# Patient Record
Sex: MALE | Race: WHITE | NOT HISPANIC OR LATINO | Employment: FULL TIME | URBAN - METROPOLITAN AREA
[De-identification: names, ages, dates, MRNs, and addresses within clinical notes are randomized per-mention and may not be internally consistent; named-entity substitution may affect disease eponyms.]

---

## 2017-02-20 ENCOUNTER — TELEPHONE (OUTPATIENT)
Dept: FAMILY MEDICINE | Facility: CLINIC | Age: 42
End: 2017-02-20

## 2017-02-20 NOTE — TELEPHONE ENCOUNTER
"Patient complains that since 2/17/17 call to schedule appointment for shoulder/neck pain, (appointment  2/22/17)  he began with CP,SOB, L arm pain, facial pain, elevated BP. Went to [facility] ER and was admitted and discharged without cardiology referral.  Calls today for referral to cardiologist. During call, states that CP continues, although not as bad, /117 an hour ago. Explained that with these continuing symptoms, he needs to return to ER for evaluation. He states he understands, but needs cardiology referral. ER evaluation needed for appropriate treatment. He feels he is "getting run around" so explained that clinic is not set up to evaluate his symptoms, administer appropriate treatment,  and cannot give referral without assessment. Chances very good that if he comes here, he would be sent by ambulance to [facility] for evaluation. He states he does understand and to cancel 2/22/17 appointment.  "

## 2017-02-20 NOTE — TELEPHONE ENCOUNTER
----- Message from Caterina Jerez sent at 2/20/2017 10:48 AM CST -----  Contact: wife/Naveen/[phone]  Patients wife states that patient need to see the doctor today for a hospital f/u visit from [facility]/chest pain.  Please call [employer] at [phone].

## 2018-10-05 ENCOUNTER — OFFICE VISIT (OUTPATIENT)
Dept: URGENT CARE | Facility: CLINIC | Age: 43
End: 2018-10-05

## 2018-10-05 VITALS
HEART RATE: 97 BPM | TEMPERATURE: 97 F | WEIGHT: 256 LBS | RESPIRATION RATE: 16 BRPM | HEIGHT: 71 IN | SYSTOLIC BLOOD PRESSURE: 135 MMHG | BODY MASS INDEX: 35.84 KG/M2 | DIASTOLIC BLOOD PRESSURE: 88 MMHG | OXYGEN SATURATION: 97 %

## 2018-10-05 DIAGNOSIS — R10.32 LEFT LOWER QUADRANT ABDOMINAL PAIN OF UNKNOWN ETIOLOGY: ICD-10-CM

## 2018-10-05 DIAGNOSIS — R50.9 FEVER, UNSPECIFIED FEVER CAUSE: ICD-10-CM

## 2018-10-05 DIAGNOSIS — R10.9 ABDOMINAL PAIN, UNSPECIFIED ABDOMINAL LOCATION: Primary | ICD-10-CM

## 2018-10-05 DIAGNOSIS — R19.7 DIARRHEA, UNSPECIFIED TYPE: ICD-10-CM

## 2018-10-05 LAB
BILIRUB UR QL STRIP: NEGATIVE
GLUCOSE UR QL STRIP: NEGATIVE
KETONES UR QL STRIP: NEGATIVE
LEUKOCYTE ESTERASE UR QL STRIP: NEGATIVE
PH, POC UA: 5.5
POC BLOOD, URINE: NEGATIVE
POC NITRATES, URINE: NEGATIVE
PROT UR QL STRIP: NEGATIVE
SP GR UR STRIP: 1.02 (ref 1–1.03)
UROBILINOGEN UR STRIP-ACNC: NORMAL (ref 0.3–2.2)

## 2018-10-05 PROCEDURE — 99214 OFFICE O/P EST MOD 30 MIN: CPT | Mod: 25,S$GLB,, | Performed by: NURSE PRACTITIONER

## 2018-10-05 PROCEDURE — 81003 URINALYSIS AUTO W/O SCOPE: CPT | Mod: QW,S$GLB,, | Performed by: NURSE PRACTITIONER

## 2018-10-05 RX ORDER — LISINOPRIL 10 MG/1
10 TABLET ORAL DAILY
COMMUNITY
End: 2020-08-03 | Stop reason: DRUGHIGH

## 2018-10-05 NOTE — PROGRESS NOTES
"Subjective:       Patient ID: Shady Snyder is a 43 y.o. male.    Vitals:  height is 5' 11" (1.803 m) and weight is 116.1 kg (256 lb). His temperature is 97.3 °F (36.3 °C). His blood pressure is 135/88 and his pulse is 97. His respiration is 16 and oxygen saturation is 97%.     Chief Complaint: Headache; Abdominal Pain; and Fever (constant)  Complains of left lower quadrant abdominal pain with diarrhea for the past several days.  Headache    The current episode started today. The problem occurs constantly. The pain is located in the right unilateral region. The pain does not radiate. The pain quality is similar to prior headaches. The pain is at a severity of 6/10. The pain is mild. Associated symptoms include abdominal pain and a fever. Pertinent negatives include no back pain, blurred vision, nausea, sore throat or vomiting. He has tried NSAIDs for the symptoms. The treatment provided no relief.   Abdominal Pain   This is a new problem. The current episode started 1 to 4 weeks ago. The onset quality is gradual. The problem occurs intermittently. The problem has been gradually worsening. The pain is located in the LLQ and RUQ. The pain is at a severity of 10/10. The pain is severe. The quality of the pain is sharp and cramping. The abdominal pain radiates to the back. Associated symptoms include diarrhea, a fever and headaches. Pertinent negatives include no nausea or vomiting. Nothing aggravates the pain. The treatment provided mild relief.   Fever    This is a new problem. The current episode started 1 to 4 weeks ago. The problem occurs intermittently. His temperature was unmeasured prior to arrival. Associated symptoms include abdominal pain, diarrhea and headaches. Pertinent negatives include no chest pain, nausea, rash, sore throat or vomiting. He has tried NSAIDs for the symptoms. The treatment provided mild relief.     Review of Systems   Constitution: Positive for fever. Negative for chills.   HENT: " Negative for sore throat.    Eyes: Negative for blurred vision.   Cardiovascular: Negative for chest pain.   Respiratory: Negative for shortness of breath.    Skin: Negative for rash.   Musculoskeletal: Negative for back pain and joint pain.   Gastrointestinal: Positive for abdominal pain and diarrhea. Negative for nausea and vomiting.   Neurological: Positive for headaches.   Psychiatric/Behavioral: The patient is not nervous/anxious.        Objective:      Physical Exam   Constitutional: He is oriented to person, place, and time. He appears well-developed and well-nourished. He is cooperative.  Non-toxic appearance. He does not appear ill. No distress.   HENT:   Head: Normocephalic and atraumatic.   Right Ear: Hearing, tympanic membrane, external ear and ear canal normal.   Left Ear: Hearing, tympanic membrane, external ear and ear canal normal.   Nose: Nose normal. No mucosal edema, rhinorrhea or nasal deformity. No epistaxis. Right sinus exhibits no maxillary sinus tenderness and no frontal sinus tenderness. Left sinus exhibits no maxillary sinus tenderness and no frontal sinus tenderness.   Mouth/Throat: Uvula is midline, oropharynx is clear and moist and mucous membranes are normal. No trismus in the jaw. Normal dentition. No uvula swelling. No posterior oropharyngeal erythema.   Eyes: Conjunctivae and lids are normal. Right eye exhibits no discharge. Left eye exhibits no discharge. No scleral icterus.   Sclera clear bilat   Neck: Trachea normal, normal range of motion, full passive range of motion without pain and phonation normal. Neck supple.   Cardiovascular: Normal rate, regular rhythm, normal heart sounds, intact distal pulses and normal pulses.   Pulmonary/Chest: Effort normal and breath sounds normal. No respiratory distress.   Abdominal: Soft. Normal appearance and bowel sounds are normal. He exhibits no distension, no pulsatile midline mass and no mass. There is tenderness.   Mild LLQ abdominal  tenderness.   Musculoskeletal: Normal range of motion. He exhibits no edema or deformity.   Neurological: He is alert and oriented to person, place, and time. He exhibits normal muscle tone. Coordination normal.   Skin: Skin is warm, dry and intact. He is not diaphoretic. No pallor.   Psychiatric: He has a normal mood and affect. His speech is normal and behavior is normal. Judgment and thought content normal. Cognition and memory are normal.   Nursing note and vitals reviewed.    43 year old male presents with fever, diarrhea, and LLQ abdominal pain. Will send to ER for comprehensive work up. Patient agreeable to plan.   Assessment:       No diagnosis found.    Plan:         There are no diagnoses linked to this encounter.

## 2018-10-05 NOTE — PATIENT INSTRUCTIONS
Abdominal Pain    Abdominal pain is pain in the stomach or belly area. Everyone has this pain from time to time. In many cases it goes away on its own. But abdominal pain can sometimes be due to a serious problem, such as appendicitis. So its important to know when to seek help.  Causes of abdominal pain  There are many possible causes of abdominal pain. Common causes in adults include:  · Constipation, diarrhea, or gas  · Stomach acid flowing back up into the esophagus (acid reflux or heartburn)  · Severe acid reflux, called GERD (gastroesophageal reflux disease)  · A sore in the lining of the stomach or small intestine (peptic ulcer)  · Inflammation of the gallbladder, liver, or pancreas  · Gallstones or kidney stones  · Appendicitis   · Intestinal blockage   · An internal organ pushing through a muscle or other tissue (hernia)  · Urinary tract infections  · In women, menstrual cramps, fibroids, or endometriosis  · Inflammation or infection of the intestines  Diagnosing the cause of abdominal pain  Your healthcare provider will do a physical exam help find the cause of your pain. If needed, tests will be ordered. Belly pain has many possible causes. So it can be hard to find the reason for your pain. Giving details about your pain can help. Tell your provider where and when you feel the pain, and what makes it better or worse. Also let your provider know if you have other symptoms such as:  · Fever  · Tiredness  · Upset stomach (nausea)  · Vomiting  · Changes in bathroom habits  Treating abdominal pain  Some causes of pain need emergency medical treatment right away. These include appendicitis or a bowel blockage. Other problems can be treated with rest, fluids, or medicines. Your healthcare provider can give you specific instructions for treatment or self-care based on what is causing your pain.  If you have vomiting or diarrhea, sip water or other clear fluids. When you are ready to eat solid foods again,  start with small amounts of easy-to-digest, low-fat foods. These include apple sauce, toast, or crackers.   When to seek medical care  Call 911 or go to the hospital right away if you:  · Cant pass stool and are vomiting  · Are vomiting blood or have bloody diarrhea or black, tarry diarrhea  · Have chest, neck, or shoulder pain  · Feel like you might pass out  · Have pain in your shoulder blades with nausea  · Have sudden, severe belly pain  · Have new, severe pain unlike any you have felt before  · Have a belly that is rigid, hard, and tender to touch  Call your healthcare provider if you have:  · Pain for more than 5 days  · Bloating for more than 2 days  · Diarrhea for more than 5 days  · A fever of 100.4°F (38.0°C) or higher, or as directed by your provider  · Pain that gets worse  · Weight loss for no reason  · Continued lack of appetite  · Blood in your stool  How to prevent abdominal pain  Here are some tips to help prevent abdominal pain:  · Eat smaller amounts of food at one time.  · Avoid greasy, fried, or other high-fat foods.  · Avoid foods that give you gas.  · Exercise regularly.  · Drink plenty of fluids.  To help prevent GERD symptoms:  · Quit smoking.  · Reduce alcohol and certain foods that increase stomach acid.  · Avoid aspirin and over-the-counter pain and fever medicines (NSAIDS or nonsteroidal anti-inflammatory drugs), if possible  · Lose extra weight.  · Finish eating at least 2 hours before you go to bed or lie down.  · Raise the head of your bed.  Date Last Reviewed: 7/1/2016  © 9426-5651 [employer]. [address] [place] [address], [place] [address]. All rights reserved. This information is not intended as a substitute for professional medical care. Always follow your healthcare professional's instructions.

## 2019-06-07 ENCOUNTER — CLINICAL SUPPORT (OUTPATIENT)
Dept: URGENT CARE | Facility: CLINIC | Age: 44
End: 2019-06-07
Payer: COMMERCIAL

## 2019-06-07 VITALS
RESPIRATION RATE: 16 BRPM | BODY MASS INDEX: 35.42 KG/M2 | TEMPERATURE: 98 F | HEIGHT: 71 IN | DIASTOLIC BLOOD PRESSURE: 87 MMHG | SYSTOLIC BLOOD PRESSURE: 121 MMHG | HEART RATE: 75 BPM | OXYGEN SATURATION: 97 % | WEIGHT: 253 LBS

## 2019-06-07 DIAGNOSIS — L03.211 CELLULITIS OF CHIN: ICD-10-CM

## 2019-06-07 DIAGNOSIS — L02.01 ABSCESS OF CHIN: Primary | ICD-10-CM

## 2019-06-07 PROCEDURE — 99214 OFFICE O/P EST MOD 30 MIN: CPT | Mod: S$GLB,,, | Performed by: NURSE PRACTITIONER

## 2019-06-07 PROCEDURE — 99214 PR OFFICE/OUTPT VISIT, EST, LEVL IV, 30-39 MIN: ICD-10-PCS | Mod: S$GLB,,, | Performed by: NURSE PRACTITIONER

## 2019-06-07 RX ORDER — SULFAMETHOXAZOLE AND TRIMETHOPRIM 800; 160 MG/1; MG/1
1 TABLET ORAL 2 TIMES DAILY
Qty: 14 TABLET | Refills: 0 | Status: SHIPPED | OUTPATIENT
Start: 2019-06-07 | End: 2019-06-14

## 2019-06-07 RX ORDER — MUPIROCIN 20 MG/G
OINTMENT TOPICAL
Qty: 22 G | Refills: 1 | Status: SHIPPED | OUTPATIENT
Start: 2019-06-07 | End: 2019-11-12

## 2019-06-07 NOTE — PATIENT INSTRUCTIONS
Abscess (Antibiotic Treatment Only)  An abscess (sometimes called a boil) happens when bacteria get trapped under the skin and start to grow. Pus forms inside the abscess as the body responds to the bacteria. An abscess can happen with an insect bite, ingrown hair, blocked oil gland, pimple, cyst, or puncture wound.  In the early stages, your wound may be red and tender. For this stage, you may get antibiotics. If the abscess does not get better with antibiotics, it will need to be drained with a small cut.  Home care  These tips will help you care for your abscess at home:  · Soak the wound in hot water or apply hot packs (small towel soaked in hot water) to the area for 20 minutes at a time. Do this 3 to 4 times a day.  · Do not cut, squeeze, or pop the boil yourself.  · Apply antibiotic cream or ointment to the skin 3 to 4 times a day, unless something else was prescribed. Some ointments include an antibiotic plus a pain reliever.  · If your doctor prescribed antibiotics, do not stop taking them until you have finished the medicine or the doctor tells you to stop.  · You may use an over-the-counter pain medicine to control pain, unless another pain medicine was prescribed. If you have chronic liver or kidney disease or ever had a stomach ulcer or gastrointestinal bleeding, talk with your doctor before using these any of these.  Follow-up care  Follow up with your healthcare provider, or as advised. Check your wound each day for the signs of worsening infection listed below.  When to seek medical advice  Get prompt medical attention if any of these occur:  · An increase in redness or swelling  · Red streaks in the skin leading away from the abscess  · An increase in local pain or swelling  · Fever of 100.4ºF (38ºC) or higher, or as directed by your healthcare provider  · Pus or fluid coming from the abscess  · Boil returns after getting better  Date Last Reviewed: 9/1/2016  © 7773-8900 [employer]  [employer]. [address] [place] [address]. All rights reserved. This information is not intended as a substitute for professional medical care. Always follow your healthcare professional's instructions.        Facial Cellulitis  Cellulitis is an infection of the deep layers of skin. A break in the skin, such as a cut or scratch, can let bacteria under the skin. It may also occur from an infected oil gland (pimple) or hair follicle. If the bacteria get to deep layers of the skin, it can be serious. If not treated, cellulitis can get into the bloodstream and lymph nodes. The infection can then spread throughout the body. This causes serious illness.  Cellulitis causes the affected skin to become red, swollen, warm, and sore. The reddened areas have a visible border. You may have a fever, chills, and pain.  Cellulitis is treated with antibiotics taken for 7 to 10 days. Symptoms should get better 1 to 2 days after treatment is started. Make sure to take all the antibiotics for the full number of days until they are gone. Keep taking the medication even if your symptoms go away.  Home care  Follow these tips:  · Take all of the antibiotic medicine exactly as directed until it is gone. Dont miss any doses, especially during the first 7 days. Dont stop taking it when your symptoms get better.  · Use a cool compress (face cloth soaked in cool water) on your face to help reduce swelling and pain.  · You may use acetaminophen or ibuprofen to reduce pain. Dont use these if you have chronic liver or kidney disease, or ever had a stomach ulcer or gastrointestinal bleeding. Talk with your healthcare provider first.  Follow-up care  Follow up with your healthcare provider, or as advised. If your infection does not go away on the first antibiotic, your healthcare provider will prescribe a different one.  When to seek medical advice  Call your healthcare provider right away if any of these occur:  · Fever higher of 100.4º F  (38.0º C) or higher after 2 days on antibiotics  · Red areas that spread  · Swelling or pain that gets worse  · Fluid leaking from the skin (pus)  · An eyelid that swells shut or leaks fluid (pus)  · Headache or neck pain that gets worse  · Unusual drowsiness or confusion  · Convulsions (seizure)  · Change in eyesight     Date Last Reviewed: 9/1/2016  © 8202-2477 [employer] [address] [place] [address], [place] [address]. All rights reserved. This information is not intended as a substitute for professional medical care. Always follow your healthcare professional's instructions.

## 2019-06-07 NOTE — PROGRESS NOTES
"Subjective:       Patient ID: Shady Snyder is a 43 y.o. male.    Vitals:  height is 5' 11" (1.803 m) and weight is 114.8 kg (253 lb). His oral temperature is 97.8 °F (36.6 °C). His blood pressure is 121/87 and his pulse is 75. His respiration is 16 and oxygen saturation is 97%.     Chief Complaint: Insect Bite    Insect Bite   This is a new problem. The current episode started in the past 7 days. The problem occurs constantly. The problem has been gradually worsening. Pertinent negatives include no arthralgias, chest pain, chills, congestion, coughing, fatigue, fever, headaches, joint swelling, myalgias, nausea, rash, sore throat, vertigo or vomiting. Nothing aggravates the symptoms. He has tried nothing for the symptoms. The treatment provided no relief.       Constitution: Negative for chills, fatigue and fever.   HENT: Negative for congestion and sore throat.    Neck: Negative for painful lymph nodes.   Cardiovascular: Negative for chest pain and leg swelling.   Eyes: Negative for double vision and blurred vision.   Respiratory: Negative for cough and shortness of breath.    Gastrointestinal: Negative for nausea, vomiting and diarrhea.   Genitourinary: Negative for dysuria, frequency and urgency.   Musculoskeletal: Negative for joint pain, joint swelling, muscle cramps and muscle ache.   Skin: Positive for erythema and abscess. Negative for color change, pale and rash.   Allergic/Immunologic: Negative for seasonal allergies.   Neurological: Negative for dizziness, history of vertigo, light-headedness, passing out and headaches.   Hematologic/Lymphatic: Negative for swollen lymph nodes, easy bruising/bleeding and history of blood clots. Does not bruise/bleed easily.   Psychiatric/Behavioral: Negative for nervous/anxious, sleep disturbance and depression. The patient is not nervous/anxious.        Objective:      Physical Exam   Constitutional: He is oriented to person, place, and time. He appears " well-developed and well-nourished. He is cooperative.  Non-toxic appearance. He does not appear ill. No distress.   HENT:   Head: Normocephalic and atraumatic.       Right Ear: Hearing, tympanic membrane, external ear and ear canal normal.   Left Ear: Hearing, tympanic membrane, external ear and ear canal normal.   Nose: Nose normal. No mucosal edema, rhinorrhea or nasal deformity. No epistaxis. Right sinus exhibits no maxillary sinus tenderness and no frontal sinus tenderness. Left sinus exhibits no maxillary sinus tenderness and no frontal sinus tenderness.   Mouth/Throat: Uvula is midline, oropharynx is clear and moist and mucous membranes are normal. No trismus in the jaw. Normal dentition. No uvula swelling. No posterior oropharyngeal erythema.   Erythematous tender lesion to chin, no fluctuance with surrounding induration   Eyes: Conjunctivae and lids are normal. Right eye exhibits no discharge. Left eye exhibits no discharge. No scleral icterus.   Sclera clear bilat   Neck: Trachea normal, normal range of motion, full passive range of motion without pain and phonation normal. Neck supple.   Cardiovascular: Normal rate, regular rhythm, normal heart sounds, intact distal pulses and normal pulses.   Pulmonary/Chest: Effort normal and breath sounds normal. No respiratory distress.   Abdominal: Soft. Normal appearance and bowel sounds are normal. He exhibits no distension, no pulsatile midline mass and no mass. There is no tenderness.   Musculoskeletal: Normal range of motion. He exhibits no edema or deformity.   Neurological: He is alert and oriented to person, place, and time. He exhibits normal muscle tone. Coordination normal.   Skin: Skin is warm, dry and intact. He is not diaphoretic. There is erythema. No pallor.   Psychiatric: He has a normal mood and affect. His speech is normal and behavior is normal. Judgment and thought content normal. Cognition and memory are normal.   Nursing note and vitals  reviewed.      Assessment:       1. Abscess of chin    2. Cellulitis of chin        Plan:         Abscess of chin    Cellulitis of chin    Other orders  -     sulfamethoxazole-trimethoprim 800-160mg (BACTRIM DS) 800-160 mg Tab; Take 1 tablet by mouth 2 (two) times daily. for 7 days  Dispense: 14 tablet; Refill: 0  -     mupirocin (BACTROBAN) 2 % ointment; Apply to affected area 3 times daily  Dispense: 22 g; Refill: 1

## 2019-07-19 ENCOUNTER — OFFICE VISIT (OUTPATIENT)
Dept: URGENT CARE | Facility: CLINIC | Age: 44
End: 2019-07-19
Payer: COMMERCIAL

## 2019-07-19 VITALS
RESPIRATION RATE: 20 BRPM | DIASTOLIC BLOOD PRESSURE: 89 MMHG | HEIGHT: 71 IN | SYSTOLIC BLOOD PRESSURE: 131 MMHG | BODY MASS INDEX: 35.98 KG/M2 | OXYGEN SATURATION: 98 % | WEIGHT: 257 LBS | HEART RATE: 82 BPM | TEMPERATURE: 97 F

## 2019-07-19 DIAGNOSIS — J01.90 ACUTE NON-RECURRENT SINUSITIS, UNSPECIFIED LOCATION: Primary | ICD-10-CM

## 2019-07-19 PROCEDURE — 96372 PR INJECTION,THERAP/PROPH/DIAG2ST, IM OR SUBCUT: ICD-10-PCS | Mod: S$GLB,,, | Performed by: NURSE PRACTITIONER

## 2019-07-19 PROCEDURE — 99214 PR OFFICE/OUTPT VISIT, EST, LEVL IV, 30-39 MIN: ICD-10-PCS | Mod: 25,S$GLB,, | Performed by: NURSE PRACTITIONER

## 2019-07-19 PROCEDURE — 99214 OFFICE O/P EST MOD 30 MIN: CPT | Mod: 25,S$GLB,, | Performed by: NURSE PRACTITIONER

## 2019-07-19 PROCEDURE — 96372 THER/PROPH/DIAG INJ SC/IM: CPT | Mod: S$GLB,,, | Performed by: NURSE PRACTITIONER

## 2019-07-19 RX ORDER — AMOXICILLIN AND CLAVULANATE POTASSIUM 875; 125 MG/1; MG/1
1 TABLET, FILM COATED ORAL 2 TIMES DAILY
Qty: 20 TABLET | Refills: 0 | Status: SHIPPED | OUTPATIENT
Start: 2019-07-19 | End: 2019-07-29

## 2019-07-19 RX ORDER — DEXAMETHASONE SODIUM PHOSPHATE 4 MG/ML
8 INJECTION, SOLUTION INTRA-ARTICULAR; INTRALESIONAL; INTRAMUSCULAR; INTRAVENOUS; SOFT TISSUE
Status: COMPLETED | OUTPATIENT
Start: 2019-07-19 | End: 2019-07-19

## 2019-07-19 RX ORDER — PREDNISONE 20 MG/1
20 TABLET ORAL 2 TIMES DAILY
Qty: 10 TABLET | Refills: 0 | Status: SHIPPED | OUTPATIENT
Start: 2019-07-19 | End: 2019-07-24

## 2019-07-19 RX ADMIN — DEXAMETHASONE SODIUM PHOSPHATE 8 MG: 4 INJECTION, SOLUTION INTRA-ARTICULAR; INTRALESIONAL; INTRAMUSCULAR; INTRAVENOUS; SOFT TISSUE at 11:07

## 2019-07-19 NOTE — PATIENT INSTRUCTIONS
Symptomatic treatment:    Alternate Tylenol and Ibuprofen every 3 hrs for fever, pain and inflammation. Avoid Nsaids if you are pregnant or have advanced kidney disease.     salt water gargles to soothe throat from post nasal drip  Honey/lemon water or warm tea to soothe throat from post nasal drip  Cepachol helps to soothe the discomfort in throat from post nasal drip    Cold-eeze helps to reduce the duration of URI symptoms if taken early  Elderberry to reduce duration of viral URI symptoms    Nasal saline spray reduces inflammation and dryness  Warm face compresses/hot showers as often as you can to open sinuses and allow to drain.   Flonase OTC or Nasacort OTC to help decrease inflammation in nasal turbinates and allow sinuses to drain    Vicks vapor rub at night  Simple foods like chicken noodle soup help provide hydration and nutrition    Delsym helps with coughing at night    Zantac will help if there is reflux from the post nasal drip and helpful to take at night    Zyrtec/Claritin during the day and Benadryl at night may help if allergy component concurrently with URI    Rest as much as you can    Your symptoms will likely last 5-7 days, maybe longer depending on how it affects your body.  You are contagious 5-7, so minimize contact with others to reduce the spread to others and stay home from work or school as we discussed. Dehydration is preventable but is one of the main reasons why you will feel so badly. Drink pedialyte, gatorade or propel. Stay hydrated.  Antibiotics are not needed unless a complication(such as Otitis Media, Bacterial sinus infection or pneumonia) develops. Taking antibiotics for Flu/Cold is not supported by evidence-based medicine and can expose you to unnecessary side effects of the medication, such as anaphylaxis, yeast infection and leads to antibiotic resistance.   If you experience any:  Chest pain, shortness of breath, wheezing or difficulty breathing,  Severe headache, face,  neck or ear pain,  New rash,  Fever over 101.5º F (38.6 C) for more than three days,  Confusion, behavior change or seizure,  Severe weakness or dizziness, please go to the ER immediately for further testing.             Acute Sinusitis    Acute sinusitis is irritation and swelling of the sinuses. It is usually caused by a viral infection after a common cold. Your doctor can help you find relief.  What is acute sinusitis?  Sinuses are air-filled spaces in the skull behind the face. They are kept moist and clean by a lining of mucosa. Things such as pollen, smoke, and chemical fumes can irritate the mucosa. It can then swell up. As a response to irritation, the mucosa makes more mucus and other fluids. Tiny hairlike cilia cover the mucosa. Cilia help carry mucus toward the opening of the sinus. Too much mucus may cause the cilia to stop working. This blocks the sinus opening. A buildup of fluid in the sinuses then causes pain and pressure. It can also encourage bacteria to grow in the sinuses.  Common symptoms of acute sinusitis  You may have:  · Facial soreness pain  · Headache  · Fever  · Fluid draining in the back of the throat (postnasal drip)  · Congestion  · Drainage that is thick and colored, instead of clear  · Cough  Diagnosing acute sinusitis  Your doctor will ask about your symptoms and health history. He or she will look at your ear, nose, and throat. You usually won't need to have X-rays taken.    The doctor may take a sample of mucus to check for bacteria. If you have sinusitis that keeps coming back, you may need imaging tests such as X-rays or CAT scans. This will help your doctor check for a structural problem that may be causing the infection.  Treating acute sinusitis  Treatment is aimed at unblocking the sinus opening and helping the cilia work again. You may need to take antihistamine and decongestant medicine. These can reduce inflammation and decrease the amount of fluid your sinuses make. If  you have a bacterial infection, you will need to take antibiotic medicine for 10 to 14 days. Take this medicine until it is gone, even if you feel better.  Date Last Reviewed: 10/1/2016  © 0461-2082 [employer]. [address] [place] [address], [place] [address]. All rights reserved. This information is not intended as a substitute for professional medical care. Always follow your healthcare professional's instructions.        Preventing Sinusitis    Colds, flu, and allergies make it more likely for you to get sinusitis. Do your best to prevent sinusitis by preventing these problems. Do what you can to avoid getting colds and other infections. Stay away from things that cause allergies (allergens). Keep your sinuses as moist as you can.  Tips for air travel  When traveling on an airplane, use saline nasal spray to keep your sinuses moist. Drink plenty of fluids. You may also want to take a decongestant before you get on the plane.   Prevent colds  Do what you can to avoid being exposed to colds and flu. When possible, take more time to rest when you feel something coming on.  · Wash your hands often. This is especially important during cold and flu season. Try not to touch your face.  · As much as possible, stay away from infected people.  · Follow these standbys for staying healthy: Eat balanced meals, exercise regularly, and get plenty of sleep.  Stay away from allergens  First find out what things youre allergic to. Then take steps to stay away from allergens or irritants in the air such as dust, pollution, and pollen.  · Wear a mask when you clean. Or consider hiring a  to help you stay away from dust.  · Sit in the nonsmoking sections of restaurants.  · Don't go outdoors during peak pollution hours such as rush hour.  · Keep an air conditioner on during allergy season. Clean its filter regularly.  · Ask your healthcare provider about a referral to have an allergy evaluation. Or ask for a  referral to see an allergy specialist.  Boost moisture  Keeping your sinuses moist makes your mucus thinner. This allows your sinuses to drain better. And this helps prevent infection. Ask your doctor about these suggestions:  · Use a humidifier. Clean it often to remove any mold or mildew.  · Drink several glasses of water a day.  · Stay away from drying beverages such as alcohol and coffee.  · Stay away from all types of smoke, which dries out sinus linings. This includes tobacco smoke and chemical smoke in workplace settings.  · Use saltwater rinses.  Date Last Reviewed: 10/1/2016  © 5241-5424 [employer]. [address] [place] [address], [place] [address]. All rights reserved. This information is not intended as a substitute for professional medical care. Always follow your healthcare professional's instructions.        Self-Care for Sinusitis     Drinking plenty of water can help sinuses drain.   Sinusitis can often be managed with self-care. Self-care can keep sinuses moist and make you feel more comfortable. Remember to follow your doctor's instructions closely. This can make a big difference in getting your sinus problem under control.  Drink fluids  Drinking extra fluids helps thin your mucus. This lets it drain from your sinuses more easily. Have a glass of water every hour or two. A humidifier helps in much the same way. Fluids can also offset the drying effects of certain medicines. If you use a humidifier, follow the product maker's instructions on how to use it. Clean it on a regular schedule.  Use saltwater rinses  Rinses help keep your sinuses and nose moist. Mix a teaspoon of salt in 8 ounces of fresh, warm water. Use a bulb syringe to gently squirt the water into your nose a few times a day. You can also buy ready-made saline nasal sprays.  Apply hot or cold packs  Applying heat to the area surrounding your sinuses may make you feel more comfortable. Use a hot water bottle or a hand towel  dipped in hot water. Some people also find ice packs effective for relieving pain.  Medicines  Your doctor may prescribe medications to help treat your sinusitis. If you have an infection, antibiotics can help clear it up. If you are prescribed antibiotics, take all pills on schedule until they are gone, even if you feel better. Decongestants help relieve swelling. Use decongestant sprays for short periods only under the direction of your doctor. If you have allergies, your doctor may prescribe medications to help relieve them.   Date Last Reviewed: 10/1/2016  © 1300-2426 [employer]. [address] [place] [address]. All rights reserved. This information is not intended as a substitute for professional medical care. Always follow your healthcare professional's instructions.

## 2019-07-19 NOTE — PROGRESS NOTES
"Subjective:       Patient ID: Shady Snyder is a 44 y.o. male.    Vitals:  height is 5' 11" (1.803 m) and weight is 116.6 kg (257 lb). His oral temperature is 97.4 °F (36.3 °C). His blood pressure is 131/89 and his pulse is 82. His respiration is 20 and oxygen saturation is 98%.     Chief Complaint: Sinus Problem    Sinus Problem   This is a recurrent problem. The current episode started 1 to 4 weeks ago. Associated symptoms include chills, congestion, headaches, sinus pressure and a sore throat. Pertinent negatives include no coughing or shortness of breath. Treatments tried: sudafed        Constitution: Positive for chills and fatigue. Negative for fever.   HENT: Positive for congestion, postnasal drip, sinus pain, sinus pressure and sore throat.    Neck: Negative for painful lymph nodes.   Cardiovascular: Negative for chest pain and leg swelling.   Eyes: Negative for double vision and blurred vision.   Respiratory: Negative for cough and shortness of breath.    Gastrointestinal: Negative for nausea, vomiting and diarrhea.   Genitourinary: Negative for dysuria, frequency and urgency.   Musculoskeletal: Negative for joint pain, joint swelling, muscle cramps and muscle ache.   Skin: Negative for color change, pale and rash.   Allergic/Immunologic: Negative for seasonal allergies.   Neurological: Positive for headaches. Negative for dizziness, history of vertigo, light-headedness and passing out.   Hematologic/Lymphatic: Negative for swollen lymph nodes, easy bruising/bleeding and history of blood clots. Does not bruise/bleed easily.   Psychiatric/Behavioral: Negative for nervous/anxious, sleep disturbance and depression. The patient is not nervous/anxious.        Objective:      Physical Exam   Constitutional: He is oriented to person, place, and time. He appears well-developed and well-nourished. He is active and cooperative.   HENT:   Head: Normocephalic and atraumatic.   Right Ear: Hearing, external ear and " ear canal normal. A middle ear effusion is present.   Left Ear: Hearing, external ear and ear canal normal. A middle ear effusion is present.   Nose: Mucosal edema and rhinorrhea present. Right sinus exhibits maxillary sinus tenderness and frontal sinus tenderness. Left sinus exhibits maxillary sinus tenderness and frontal sinus tenderness.   Mouth/Throat: Uvula is midline and mucous membranes are normal. Posterior oropharyngeal erythema present.   Eyes: Pupils are equal, round, and reactive to light. Conjunctivae, EOM and lids are normal.   Neck: Trachea normal, normal range of motion and phonation normal. Neck supple.   Cardiovascular: Normal rate, regular rhythm, normal heart sounds, intact distal pulses and normal pulses.   Pulmonary/Chest: Effort normal and breath sounds normal.   Abdominal: Soft. Bowel sounds are normal.   Musculoskeletal: Normal range of motion.   Neurological: He is alert and oriented to person, place, and time. He has normal strength. GCS eye subscore is 4. GCS verbal subscore is 5. GCS motor subscore is 6.   Skin: Skin is warm, dry and intact.   Psychiatric: He has a normal mood and affect. His behavior is normal. Judgment and thought content normal.   Nursing note and vitals reviewed.      Assessment:       1. Acute non-recurrent sinusitis, unspecified location        Plan:         Acute non-recurrent sinusitis, unspecified location    Other orders  -     dexamethasone injection 8 mg  -     predniSONE (DELTASONE) 20 MG tablet; Take 1 tablet (20 mg total) by mouth 2 (two) times daily. for 5 days  Dispense: 10 tablet; Refill: 0  -     amoxicillin-clavulanate 875-125mg (AUGMENTIN) 875-125 mg per tablet; Take 1 tablet by mouth 2 (two) times daily. for 10 days  Dispense: 20 tablet; Refill: 0

## 2019-10-22 ENCOUNTER — CLINICAL SUPPORT (OUTPATIENT)
Dept: URGENT CARE | Facility: CLINIC | Age: 44
End: 2019-10-22
Payer: COMMERCIAL

## 2019-10-22 VITALS
RESPIRATION RATE: 18 BRPM | SYSTOLIC BLOOD PRESSURE: 126 MMHG | TEMPERATURE: 98 F | OXYGEN SATURATION: 97 % | BODY MASS INDEX: 36.68 KG/M2 | WEIGHT: 262 LBS | DIASTOLIC BLOOD PRESSURE: 87 MMHG | HEIGHT: 71 IN | HEART RATE: 73 BPM

## 2019-10-22 DIAGNOSIS — J32.9 SINUSITIS, UNSPECIFIED CHRONICITY, UNSPECIFIED LOCATION: Primary | ICD-10-CM

## 2019-10-22 DIAGNOSIS — J40 BRONCHITIS: ICD-10-CM

## 2019-10-22 PROCEDURE — 96372 THER/PROPH/DIAG INJ SC/IM: CPT | Mod: S$GLB,,, | Performed by: NURSE PRACTITIONER

## 2019-10-22 PROCEDURE — 96372 PR INJECTION,THERAP/PROPH/DIAG2ST, IM OR SUBCUT: ICD-10-PCS | Mod: S$GLB,,, | Performed by: NURSE PRACTITIONER

## 2019-10-22 PROCEDURE — 99214 PR OFFICE/OUTPT VISIT, EST, LEVL IV, 30-39 MIN: ICD-10-PCS | Mod: 25,S$GLB,, | Performed by: NURSE PRACTITIONER

## 2019-10-22 PROCEDURE — 99214 OFFICE O/P EST MOD 30 MIN: CPT | Mod: 25,S$GLB,, | Performed by: NURSE PRACTITIONER

## 2019-10-22 RX ORDER — CODEINE PHOSPHATE AND GUAIFENESIN 10; 100 MG/5ML; MG/5ML
5 SOLUTION ORAL EVERY 6 HOURS PRN
Qty: 120 ML | Refills: 0 | Status: SHIPPED | OUTPATIENT
Start: 2019-10-22 | End: 2019-10-25

## 2019-10-22 RX ORDER — DEXAMETHASONE SODIUM PHOSPHATE 4 MG/ML
8 INJECTION, SOLUTION INTRA-ARTICULAR; INTRALESIONAL; INTRAMUSCULAR; INTRAVENOUS; SOFT TISSUE
Status: COMPLETED | OUTPATIENT
Start: 2019-10-22 | End: 2019-10-22

## 2019-10-22 RX ORDER — PREDNISONE 20 MG/1
20 TABLET ORAL 2 TIMES DAILY
Qty: 10 TABLET | Refills: 0 | Status: SHIPPED | OUTPATIENT
Start: 2019-10-22 | End: 2019-10-27

## 2019-10-22 RX ORDER — DOXYCYCLINE 100 MG/1
100 CAPSULE ORAL 2 TIMES DAILY
Qty: 20 CAPSULE | Refills: 0 | Status: SHIPPED | OUTPATIENT
Start: 2019-10-22 | End: 2019-11-01

## 2019-10-22 RX ADMIN — DEXAMETHASONE SODIUM PHOSPHATE 8 MG: 4 INJECTION, SOLUTION INTRA-ARTICULAR; INTRALESIONAL; INTRAMUSCULAR; INTRAVENOUS; SOFT TISSUE at 06:10

## 2019-10-22 NOTE — PROGRESS NOTES
"Subjective:       Patient ID: Shady Snyder is a 44 y.o. male.    Vitals:  height is 5' 11" (1.803 m) and weight is 118.8 kg (262 lb). His oral temperature is 97.7 °F (36.5 °C). His blood pressure is 126/87 and his pulse is 73. His respiration is 18 and oxygen saturation is 97%.     Chief Complaint: Sinus Problem    Pt presents with sinus congestion and cough x 1 week. Pt has h/o chronic sinusitis. Pt states symptoms are not improving with OTC meds.     Sinus Problem   This is a recurrent problem. The current episode started in the past 7 days. The problem has been gradually worsening since onset. There has been no fever. Associated symptoms include congestion, coughing, headaches and sinus pressure. Pertinent negatives include no chills, diaphoresis, ear pain, shortness of breath or sore throat. Treatments tried: sudafed, susan-seltzer, left over amoxicillin.       Constitution: Negative for chills, sweating, fatigue and fever.   HENT: Positive for congestion and sinus pressure. Negative for ear pain, sinus pain, sore throat and voice change.    Neck: Negative for painful lymph nodes.   Eyes: Negative for eye redness.   Respiratory: Positive for cough. Negative for chest tightness, sputum production, bloody sputum, COPD, shortness of breath, stridor, wheezing and asthma.    Gastrointestinal: Negative for nausea and vomiting.   Musculoskeletal: Negative for muscle ache.   Skin: Negative for rash.   Allergic/Immunologic: Negative for seasonal allergies and asthma.   Neurological: Positive for headaches.   Hematologic/Lymphatic: Negative for swollen lymph nodes.       Objective:      Physical Exam   Constitutional: He is oriented to person, place, and time. He appears well-developed and well-nourished. He is cooperative.  Non-toxic appearance. He does not have a sickly appearance. He does not appear ill. No distress.   HENT:   Head: Normocephalic and atraumatic.   Right Ear: Hearing, tympanic membrane, external " ear and ear canal normal.   Left Ear: Hearing, tympanic membrane, external ear and ear canal normal.   Nose: Mucosal edema and rhinorrhea present. No nasal deformity. No epistaxis. Right sinus exhibits maxillary sinus tenderness. Right sinus exhibits no frontal sinus tenderness. Left sinus exhibits maxillary sinus tenderness. Left sinus exhibits no frontal sinus tenderness.   Mouth/Throat: Uvula is midline, oropharynx is clear and moist and mucous membranes are normal. No trismus in the jaw. Normal dentition. No uvula swelling. No oropharyngeal exudate, posterior oropharyngeal edema or posterior oropharyngeal erythema.   Eyes: Conjunctivae and lids are normal. No scleral icterus.   Neck: Trachea normal, full passive range of motion without pain and phonation normal. Neck supple. No neck rigidity. No edema and no erythema present.   Cardiovascular: Normal rate, regular rhythm, normal heart sounds, intact distal pulses and normal pulses.   Pulmonary/Chest: Effort normal and breath sounds normal. No respiratory distress. He has no decreased breath sounds. He has no rhonchi.   Abdominal: Normal appearance.   Musculoskeletal: Normal range of motion. He exhibits no edema or deformity.   Neurological: He is alert and oriented to person, place, and time. He exhibits normal muscle tone. Coordination normal.   Skin: Skin is warm, dry, intact, not diaphoretic and not pale.   Psychiatric: He has a normal mood and affect. His speech is normal and behavior is normal. Judgment and thought content normal. Cognition and memory are normal.   Nursing note and vitals reviewed.        Assessment:       1. Sinusitis, unspecified chronicity, unspecified location    2. Bronchitis        Plan:         Sinusitis, unspecified chronicity, unspecified location  -     Ambulatory referral to ENT    Bronchitis    Other orders  -     doxycycline (VIBRAMYCIN) 100 MG Cap; Take 1 capsule (100 mg total) by mouth 2 (two) times daily. for 10 days   Dispense: 20 capsule; Refill: 0  -     guaifenesin-codeine 100-10 mg/5 ml (CHERATUSSIN AC)  mg/5 mL syrup; Take 5 mLs by mouth every 6 (six) hours as needed.  Dispense: 120 mL; Refill: 0  -     dexamethasone injection 8 mg  -     predniSONE (DELTASONE) 20 MG tablet; Take 1 tablet (20 mg total) by mouth 2 (two) times daily. for 5 days  Dispense: 10 tablet; Refill: 0

## 2019-10-22 NOTE — PATIENT INSTRUCTIONS
Sinusitis (Antibiotic Treatment)    The sinuses are air-filled spaces within the bones of the face. They connect to the inside of the nose. Sinusitis is an inflammation of the tissue lining the sinus cavity. Sinus inflammation can occur during a cold. It can also be due to allergies to pollens and other particles in the air. Sinusitis can cause symptoms of sinus congestion and fullness. A sinus infection causes fever, headache and facial pain. There is often green or yellow drainage from the nose or into the back of the throat (post-nasal drip). You have been given antibiotics to treat this condition.  Home care:  · Take the full course of antibiotics as instructed. Do not stop taking them, even if you feel better.  · Drink plenty of water, hot tea, and other liquids. This may help thin mucus. It also may promote sinus drainage.  · Heat may help soothe painful areas of the face. Use a towel soaked in hot water. Or,  the shower and direct the hot spray onto your face. Using a vaporizer along with a menthol rub at night may also help.   · An expectorant containing guaifenesin may help thin the mucus and promote drainage from the sinuses.  · Over-the-counter decongestants may be used unless a similar medicine was prescribed. Nasal sprays work the fastest. Use one that contains phenylephrine or oxymetazoline. First blow the nose gently. Then use the spray. Do not use these medicines more often than directed on the label or symptoms may get worse. You may also use tablets containing pseudoephedrine. Avoid products that combine ingredients, because side effects may be increased. Read labels. You can also ask the pharmacist for help. (NOTE: Persons with high blood pressure should not use decongestants. They can raise blood pressure.)  · Over-the-counter antihistamines may help if allergies contributed to your sinusitis.    · Do not use nasal rinses or irrigation during an acute sinus infection, unless told to by  your health care provider. Rinsing may spread the infection to other sinuses.  · Use acetaminophen or ibuprofen to control pain, unless another pain medicine was prescribed. (If you have chronic liver or kidney disease or ever had a stomach ulcer, talk with your doctor before using these medicines. Aspirin should never be used in anyone under 18 years of age who is ill with a fever. It may cause severe liver damage.)  · Don't smoke. This can worsen symptoms.  Follow-up care  Follow up with your healthcare provider or our staff if you are not improving within the next week.  When to seek medical advice  Call your healthcare provider if any of these occur:  · Facial pain or headache becoming more severe  · Stiff neck  · Unusual drowsiness or confusion  · Swelling of the forehead or eyelids  · Vision problems, including blurred or double vision  · Fever of 100.4ºF (38ºC) or higher, or as directed by your healthcare provider  · Seizure  · Breathing problems  · Symptoms not resolving within 10 days  Date Last Reviewed: 4/13/2015  © 9163-4340 [employer]. [address] [place] [address]. All rights reserved. This information is not intended as a substitute for professional medical care. Always follow your healthcare professional's instructions.        What Is Acute Bronchitis?  Acute bronchitis is when the airways in your lungs (bronchial tubes) become red and swollen (inflamed). It is usually caused by a viral infection. But it can also occur because of a bacteria or allergen. Symptoms include a cough that produces yellow or greenish mucus and can last for days or sometimes weeks.  Inside healthy lungs    Air travels in and out of the lungs through the airways. The linings of these airways produce sticky mucus. This mucus traps particles that enter the lungs. Tiny structures called cilia then sweep the particles out of the airways.     Healthy airway: Airways are normally open. Air moves in and  out easily.      Healthy cilia: Tiny, hairlike cilia sweep mucus and particles up and out of the airways.   Lungs with bronchitis  Bronchitis often occurs with a cold or the flu virus. The airways become inflamed (red and swollen). There is a deep hacking cough from the extra mucus. Other symptoms may include:  · Wheezing  · Chest discomfort  · Shortness of breath  · Mild fever  A second infection, this time due to bacteria, may then occur. And airways irritated by allergens or smoke are more likely to get infected.        Inflamed airway: Inflammation and extra mucus narrow the airway, causing shortness of breath.      Impaired cilia: Extra mucus impairs cilia, causing congestion and wheezing. Smoking makes the problem worse.   Making a diagnosis  A physical exam, health history, and certain tests help your healthcare provider make the diagnosis.  Health history  Your healthcare provider will ask you about your symptoms.  The exam  Your provider listens to your chest for signs of congestion. He or she may also check your ears, nose, and throat.  Possible tests  · A sputum test for bacteria. This requires a sample of mucus from your lungs.  · A nasal or throat swab. This tests to see if you have a bacterial infection.  · A chest X-ray. This is done if your healthcare provider thinks you have pneumonia.  · Tests to check for an underlying condition. Other tests may be done to check for things such as allergies, asthma, or COPD (chronic obstructive pulmonary disease). You may need to see a specialist for more lung function testing.  Treating a cough  The main treatment for bronchitis is easing symptoms. Avoiding smoke, allergens, and other things that trigger coughing can often help. If the infection is bacterial, you may be given antibiotics. During the illness, it's important to get plenty of sleep. To ease symptoms:  · Dont smoke. Also avoid secondhand smoke.  · Use a humidifier. Or try breathing in steam from a  hot shower. This may help loosen mucus.  · Drink a lot of water and juice. They can soothe the throat and may help thin mucus.  · Sit up or use extra pillows when in bed. This helps to lessen coughing and congestion.  · Ask your provider about using medicine. Ask about using cough medicine, pain and fever medicine, or a decongestant.  Antibiotics  Most cases of bronchitis are caused by cold or flu viruses. They dont need antibiotics to treat them, even if your mucus is thick and green or yellow. Antibiotics dont treat viral illness and antibiotics have not been shown to have any benefit in cases of acute bronchitis. Taking antibiotics when they are not needed increases your risk of getting an infection later that is antibiotic-resistant. Antibiotics can also cause severe cases of diarrhea that require other antibiotics to treat.  It is important that you accept your healthcare provider's opinion to not use antibiotics. Your provider will prescribe antibiotics if the infection is caused by bacteria. If they are prescribed:  · Take all of the medicine. Take the medicine until it is used up, even if symptoms have improved. If you dont, the bronchitis may come back.  · Take the medicines as directed. For instance, some medicines should be taken with food.  · Ask about side effects. Ask your provider or pharmacist what side effects are common, and what to do about them.  Follow-up care  You should see your provider again in 2 to 3 weeks. By this time, symptoms should have improved. An infection that lasts longer may mean you have a more serious problem.  Prevention  · Avoid tobacco smoke. If you smoke, quit. Stay away from smoky places. Ask friends and family not to smoke around you, or in your home or car.  · Get checked for allergies.  · Ask your provider about getting a yearly flu shot. Also ask about pneumococcal or pneumonia shots.  · Wash your hands often. This helps reduce the chance of picking up viruses that  cause colds and flu.  Call your healthcare provider if:  · Symptoms worsen, or you have new symptoms  · Breathing problems worsen or  become severe  · Symptoms dont get better within a week, or within 3 days of taking antibiotics   Date Last Reviewed: 2/1/2017  © 9939-6771 [employer]. [address] [place] [address]. All rights reserved. This information is not intended as a substitute for professional medical care. Always follow your healthcare professional's instructions.

## 2019-11-12 ENCOUNTER — HOSPITAL ENCOUNTER (EMERGENCY)
Facility: HOSPITAL | Age: 44
Discharge: HOME | End: 2019-11-12
Attending: EMERGENCY MEDICINE
Payer: COMMERCIAL

## 2019-11-12 VITALS
HEART RATE: 67 BPM | OXYGEN SATURATION: 97 % | TEMPERATURE: 98 F | WEIGHT: 265 LBS | RESPIRATION RATE: 18 BRPM | HEIGHT: 71 IN | BODY MASS INDEX: 37.1 KG/M2 | SYSTOLIC BLOOD PRESSURE: 128 MMHG | DIASTOLIC BLOOD PRESSURE: 88 MMHG

## 2019-11-12 DIAGNOSIS — G44.209 TENSION HEADACHE: Primary | ICD-10-CM

## 2019-11-12 DIAGNOSIS — G44.209 ACUTE NON INTRACTABLE TENSION-TYPE HEADACHE: ICD-10-CM

## 2019-11-12 PROCEDURE — 70450 CT HEAD/BRAIN W/O DYE: CPT | Mod: 26,,, | Performed by: RADIOLOGY

## 2019-11-12 PROCEDURE — 70450 CT HEAD WITHOUT CONTRAST: ICD-10-PCS | Mod: 26,,, | Performed by: RADIOLOGY

## 2019-11-12 PROCEDURE — 70450 CT HEAD/BRAIN W/O DYE: CPT | Mod: TC

## 2019-11-12 PROCEDURE — 99284 EMERGENCY DEPT VISIT MOD MDM: CPT | Mod: 25

## 2019-11-12 RX ORDER — METHOCARBAMOL 500 MG/1
1000 TABLET, FILM COATED ORAL 4 TIMES DAILY
Qty: 20 TABLET | Refills: 0 | Status: SHIPPED | OUTPATIENT
Start: 2019-11-12 | End: 2019-11-12 | Stop reason: SDUPTHER

## 2019-11-12 RX ORDER — METHOCARBAMOL 500 MG/1
1000 TABLET, FILM COATED ORAL 4 TIMES DAILY
Qty: 20 TABLET | Refills: 0 | Status: SHIPPED | OUTPATIENT
Start: 2019-11-12 | End: 2019-11-15

## 2019-11-12 RX ORDER — NAPROXEN 500 MG/1
500 TABLET ORAL 2 TIMES DAILY WITH MEALS
Qty: 20 TABLET | Refills: 0 | Status: SHIPPED | OUTPATIENT
Start: 2019-11-12 | End: 2019-11-12 | Stop reason: SDUPTHER

## 2019-11-12 RX ORDER — NAPROXEN 500 MG/1
500 TABLET ORAL 2 TIMES DAILY WITH MEALS
Qty: 20 TABLET | Refills: 0 | Status: ON HOLD | OUTPATIENT
Start: 2019-11-12 | End: 2020-08-30 | Stop reason: HOSPADM

## 2019-11-12 NOTE — DISCHARGE INSTRUCTIONS
Review tension headache information  Rehab as discussed  Enteric-coated Naprosyn 1 twice daily  Robaxin 2 tablets every 6 hr as needed for musculoskeletal pain  Follow-up primary care provider  Return to the ER as needed for any acute worsening

## 2019-11-12 NOTE — ED TRIAGE NOTES
Last week went to urgent care given medicine for sinus infection.  I continue to have pressure in my head.

## 2019-11-22 NOTE — ED PROVIDER NOTES
Encounter Date: 11/12/2019       History     Chief Complaint   Patient presents with    Headache     Shady Snyder is a 44 y.o.male who complains of acute headache   Mechanism:spontaneous   Location: left parietal - frontal   Character: sharp   Timing:abrupt onset   Duration:hours   Severity:severe   Radiation:from back to front   Associated:no associated fever, no vomiting, no change in strength sensation mentation or gait.   Prior:no sig history of headache.     FHx - No SAH history          Review of patient's allergies indicates:  No Known Allergies  Past Medical History:   Diagnosis Date    Diverticulitis     GERD (gastroesophageal reflux disease)     HTN (hypertension)     Kidney stone     Sleep apnea      Past Surgical History:   Procedure Laterality Date    CHOLECYSTECTOMY      FRACTURE SURGERY      GERD      rt foot surgery      VASECTOMY       Family History   Family history unknown: Yes     Social History     Tobacco Use    Smoking status: Never Smoker   Substance Use Topics    Alcohol use: Yes     Alcohol/week: 3.0 standard drinks     Types: 1 Standard drinks or equivalent, 2 Cans of beer per week     Comment: Socially    Drug use: No     Review of Systems   Constitutional: Negative.    HENT: Negative.    Eyes: Negative for photophobia and visual disturbance.   Respiratory: Negative.    Cardiovascular: Negative.    Gastrointestinal: Negative.    Musculoskeletal: Negative.    Skin: Negative.    Neurological: Positive for headaches. Negative for dizziness, tremors, seizures, syncope, facial asymmetry, speech difficulty, weakness, light-headedness and numbness.   Hematological: Negative.    Psychiatric/Behavioral: Negative for confusion.   All other systems reviewed and are negative.      Physical Exam     Initial Vitals [11/12/19 1657]   BP Pulse Resp Temp SpO2   128/88 67 18 98.3 °F (36.8 °C) 97 %      MAP       --         Physical Exam    Nursing note and vitals  reviewed.  Constitutional: He appears well-developed and well-nourished. He is not diaphoretic. No distress.   HENT:   Head: Normocephalic and atraumatic.   Mouth/Throat: Oropharynx is clear and moist.   Eyes: Conjunctivae and EOM are normal. Pupils are equal, round, and reactive to light.   Neck: Neck supple. Muscular tenderness present. No spinous process tenderness present. Normal range of motion present. No neck rigidity. Carotid bruit is not present. No JVD present.       Cardiovascular: Normal rate, regular rhythm, normal heart sounds and intact distal pulses.  No extrasystoles are present.  Exam reveals no gallop and no friction rub.    No murmur heard.  Pulses:       Radial pulses are 2+ on the right side, and 2+ on the left side.   Pulmonary/Chest: Breath sounds normal. No respiratory distress. He has no decreased breath sounds. He has no wheezes. He has no rhonchi. He has no rales. He exhibits no tenderness.   Abdominal: Soft. Bowel sounds are normal. He exhibits no distension and no mass. There is no tenderness. There is no rebound and no guarding.   Musculoskeletal: Normal range of motion. He exhibits no edema or tenderness.   Neurological: He is alert and oriented to person, place, and time. He has normal strength. No cranial nerve deficit or sensory deficit. GCS score is 15. GCS eye subscore is 4. GCS verbal subscore is 5. GCS motor subscore is 6.   Skin: Skin is warm and dry. Capillary refill takes less than 2 seconds. No rash noted. No erythema. No pallor.   Psychiatric: He has a normal mood and affect. His behavior is normal. Judgment and thought content normal.         ED Course   Procedures  Labs Reviewed - No data to display       Imaging Results          CT Head Without Contrast (Final result)  Result time 11/12/19 18:05:22    Final result by Francisco Javier Stauffer MD (11/12/19 18:05:22)                 Impression:      No acute intracranial abnormality.      Electronically signed by: Francisco Javier  Orange  Date:    11/12/2019  Time:    18:05             Narrative:    EXAMINATION:  CT HEAD WITHOUT CONTRAST    CLINICAL HISTORY:  Headache, acute, norm neuro exam;Left temporal headache;    TECHNIQUE:  Low dose axial images were obtained through the head.  Coronal and sagittal reformations were also performed. Contrast was not administered.    COMPARISON:  CT 10/24/2014.    FINDINGS:  There is no acute hemorrhage or infarction.  Normal pattern of gray-white matter differentiation.    No extra-axial fluid collections.  Ventricles are normal in size, shape and configuration.  The basal cisterns are patent.    The imaged paranasal sinuses and ethmoid air cells are well aerated.    The mastoid air cells and middle ears are normally pneumatized.                                                              Review tension headache information  Rehab as discussed  Enteric-coated Naprosyn 1 twice daily  Robaxin 2 tablets every 6 hr as needed for musculoskeletal pain  Follow-up primary care provider  Return to the ER as needed for any acute worsening    Clinical Impression:       ICD-10-CM ICD-9-CM   1. Tension headache G44.209 307.81   2. Acute non intractable tension-type headache G44.209 339.10         Disposition:   Disposition: Discharged  Condition: Stable                     Michael Mcnamara MD  11/24/19 0715

## 2020-05-21 ENCOUNTER — HOSPITAL ENCOUNTER (EMERGENCY)
Facility: HOSPITAL | Age: 45
Discharge: HOME | End: 2020-05-21
Attending: FAMILY MEDICINE
Payer: COMMERCIAL

## 2020-05-21 VITALS
OXYGEN SATURATION: 96 % | DIASTOLIC BLOOD PRESSURE: 85 MMHG | RESPIRATION RATE: 18 BRPM | HEIGHT: 71 IN | BODY MASS INDEX: 36.82 KG/M2 | WEIGHT: 263 LBS | HEART RATE: 96 BPM | SYSTOLIC BLOOD PRESSURE: 132 MMHG | TEMPERATURE: 98 F

## 2020-05-21 DIAGNOSIS — N45.1 EPIDIDYMITIS: Primary | ICD-10-CM

## 2020-05-21 LAB
BILIRUB UR QL STRIP: NEGATIVE
CLARITY UR: CLEAR
COLOR UR: YELLOW
GLUCOSE UR QL STRIP: NEGATIVE
HGB UR QL STRIP: NEGATIVE
KETONES UR QL STRIP: NEGATIVE
LEUKOCYTE ESTERASE UR QL STRIP: NEGATIVE
NITRITE UR QL STRIP: NEGATIVE
PH UR STRIP: 6 [PH] (ref 5–8)
PROT UR QL STRIP: NEGATIVE
SP GR UR STRIP: 1.02 (ref 1–1.03)
URN SPEC COLLECT METH UR: NORMAL
UROBILINOGEN UR STRIP-ACNC: NEGATIVE EU/DL

## 2020-05-21 PROCEDURE — 99284 EMERGENCY DEPT VISIT MOD MDM: CPT

## 2020-05-21 PROCEDURE — 81003 URINALYSIS AUTO W/O SCOPE: CPT

## 2020-05-21 PROCEDURE — 25000003 PHARM REV CODE 250: Performed by: FAMILY MEDICINE

## 2020-05-21 RX ORDER — SULFAMETHOXAZOLE AND TRIMETHOPRIM 800; 160 MG/1; MG/1
1 TABLET ORAL 2 TIMES DAILY
Qty: 20 TABLET | Refills: 0 | Status: SHIPPED | OUTPATIENT
Start: 2020-05-21 | End: 2020-05-31

## 2020-05-21 RX ORDER — SULFAMETHOXAZOLE AND TRIMETHOPRIM 800; 160 MG/1; MG/1
1 TABLET ORAL
Status: COMPLETED | OUTPATIENT
Start: 2020-05-21 | End: 2020-05-21

## 2020-05-21 RX ORDER — HYDROCODONE BITARTRATE AND ACETAMINOPHEN 5; 325 MG/1; MG/1
1 TABLET ORAL EVERY 8 HOURS PRN
Qty: 12 TABLET | Refills: 0 | Status: ON HOLD | OUTPATIENT
Start: 2020-05-21 | End: 2020-08-30 | Stop reason: HOSPADM

## 2020-05-21 RX ORDER — SULFAMETHOXAZOLE AND TRIMETHOPRIM 800; 160 MG/1; MG/1
1 TABLET ORAL 2 TIMES DAILY
Qty: 20 TABLET | Refills: 0 | Status: SHIPPED | OUTPATIENT
Start: 2020-05-21 | End: 2020-05-21 | Stop reason: SDUPTHER

## 2020-05-21 RX ORDER — SULFAMETHOXAZOLE AND TRIMETHOPRIM 800; 160 MG/1; MG/1
1 TABLET ORAL 2 TIMES DAILY
Status: DISCONTINUED | OUTPATIENT
Start: 2020-05-21 | End: 2020-05-21

## 2020-05-21 RX ADMIN — SULFAMETHOXAZOLE AND TRIMETHOPRIM 1 TABLET: 800; 160 TABLET ORAL at 08:05

## 2020-05-22 NOTE — ED PROVIDER NOTES
Encounter Date: 5/21/2020       History     Chief Complaint   Patient presents with    testicular swelling/redness     44-year-old male presents complaining of pain tenderness and swelling in the left scrotum is superior to the testes  it was worse earlier today patient has not had similar problems in the past no history of inguinal hernias no history of epididymitis no penile discharge he is monogamous and his wife is not having any symptoms        Review of patient's allergies indicates:  No Known Allergies  Past Medical History:   Diagnosis Date    Diverticulitis     GERD (gastroesophageal reflux disease)     HTN (hypertension)     Kidney stone     Sleep apnea      Past Surgical History:   Procedure Laterality Date    CHOLECYSTECTOMY      FRACTURE SURGERY      GERD      rt foot surgery      VASECTOMY       Family History   Family history unknown: Yes     Social History     Tobacco Use    Smoking status: Never Smoker   Substance Use Topics    Alcohol use: Yes     Alcohol/week: 3.0 standard drinks     Types: 1 Standard drinks or equivalent, 2 Cans of beer per week     Comment: Socially    Drug use: No     Review of Systems   Constitutional: Negative for fever.   HENT: Negative for sore throat.    Respiratory: Negative for shortness of breath.    Cardiovascular: Negative for chest pain.   Gastrointestinal: Negative for nausea.   Genitourinary: Positive for scrotal swelling. Negative for decreased urine volume, discharge, dysuria, flank pain, frequency, genital sores, hematuria, penile pain, penile swelling, testicular pain and urgency.   Musculoskeletal: Negative for back pain.   Skin: Negative for rash.   Neurological: Negative for weakness.   Hematological: Does not bruise/bleed easily.       Physical Exam     Initial Vitals [05/21/20 1832]   BP Pulse Resp Temp SpO2   134/83 96 18 99.1 °F (37.3 °C) 96 %      MAP       --         Physical Exam    Nursing note and vitals reviewed.  Constitutional: He  appears well-developed and well-nourished. He is not diaphoretic. No distress.   HENT:   Head: Normocephalic and atraumatic.   Right Ear: External ear normal.   Left Ear: External ear normal.   Nose: Nose normal.   Mouth/Throat: Oropharynx is clear and moist. No oropharyngeal exudate.   Eyes: EOM are normal.   Neck: Normal range of motion. Neck supple. No tracheal deviation present.   Cardiovascular: Normal rate and regular rhythm.   No murmur heard.  Pulmonary/Chest: Breath sounds normal. No stridor. No respiratory distress. He has no rales.   Abdominal: Soft. He exhibits no distension and no mass. There is no tenderness. There is no rebound.   Genitourinary: No discharge found.   Genitourinary Comments: Left than right testes are nontender there is slight swelling tenderness to the epididymis area of the left scrotum there is no inguinal tenderness or abnormality with Valsalva in the left inguinal canal   Musculoskeletal: Normal range of motion. He exhibits no edema.   Lymphadenopathy:     He has no cervical adenopathy.   Neurological: He is alert and oriented to person, place, and time. He has normal strength.   Skin: Skin is warm and dry. Capillary refill takes less than 2 seconds. No pallor.   Psychiatric: He has a normal mood and affect.         ED Course   Procedures  Labs Reviewed   URINALYSIS, REFLEX TO URINE CULTURE    Narrative:     Preferred Collection Type->Urine, Clean Catch          Imaging Results    None                                          Clinical Impression:       ICD-10-CM ICD-9-CM   1. Epididymitis N45.1 604.90             ED Disposition Condition    Discharge Stable        ED Prescriptions     Medication Sig Dispense Start Date End Date Auth. Provider    HYDROcodone-acetaminophen (NORCO) 5-325 mg per tablet Take 1 tablet by mouth every 8 (eight) hours as needed for Pain. 12 tablet 5/21/2020  Josse Valente MD    sulfamethoxazole-trimethoprim 800-160mg (BACTRIM DS) 800-160 mg Tab   (Status: Discontinued) Take 1 tablet by mouth 2 (two) times daily. for 10 days 20 tablet 5/21/2020 5/21/2020 Josse Valente MD    sulfamethoxazole-trimethoprim 800-160mg (BACTRIM DS) 800-160 mg Tab Take 1 tablet by mouth 2 (two) times daily. for 10 days 20 tablet 5/21/2020 5/31/2020 Josse Valente MD        Follow-up Information    None                                    Josse Valente MD  05/22/20 5127

## 2020-07-30 ENCOUNTER — TELEPHONE (OUTPATIENT)
Dept: PULMONOLOGY | Facility: CLINIC | Age: 45
End: 2020-07-30

## 2020-07-30 NOTE — TELEPHONE ENCOUNTER
Elaina Mixon will call the patient.      ----- Message from Cony King sent at 7/30/2020 10:04 AM CDT -----  Regarding: return call  Contact: Patient/[phone] (home)  Type:  Patient Returning Call    Who Called:  Patient/[phone] (home)     Who Left Message for Patient:  May  Does the patient know what this is regarding?:  maybe insurance?

## 2020-07-30 NOTE — TELEPHONE ENCOUNTER
Patient needs to speak to .      ----- Message from Jessy Alvares sent at 7/30/2020 11:00 AM CDT -----  Regarding: Missed call  Contact: pt  Reason: Returning call    Communication: [phone]

## 2020-08-03 ENCOUNTER — OFFICE VISIT (OUTPATIENT)
Dept: PULMONOLOGY | Facility: CLINIC | Age: 45
End: 2020-08-03
Payer: COMMERCIAL

## 2020-08-03 ENCOUNTER — LAB VISIT (OUTPATIENT)
Dept: LAB | Facility: HOSPITAL | Age: 45
End: 2020-08-03
Attending: INTERNAL MEDICINE
Payer: COMMERCIAL

## 2020-08-03 VITALS
SYSTOLIC BLOOD PRESSURE: 141 MMHG | OXYGEN SATURATION: 97 % | HEART RATE: 68 BPM | WEIGHT: 264.13 LBS | DIASTOLIC BLOOD PRESSURE: 102 MMHG | BODY MASS INDEX: 36.98 KG/M2 | HEIGHT: 71 IN

## 2020-08-03 DIAGNOSIS — E66.01 CLASS 2 SEVERE OBESITY DUE TO EXCESS CALORIES WITH SERIOUS COMORBIDITY AND BODY MASS INDEX (BMI) OF 36.0 TO 36.9 IN ADULT: ICD-10-CM

## 2020-08-03 DIAGNOSIS — G47.33 OBSTRUCTIVE SLEEP APNEA: ICD-10-CM

## 2020-08-03 DIAGNOSIS — R09.89 CHRONIC SINUS COMPLAINTS: Primary | ICD-10-CM

## 2020-08-03 DIAGNOSIS — E66.01 MORBID OBESITY: Primary | ICD-10-CM

## 2020-08-03 LAB — TSH SERPL DL<=0.005 MIU/L-ACNC: 1.24 UIU/ML (ref 0.4–4)

## 2020-08-03 PROCEDURE — 99999 PR PBB SHADOW E&M-EST. PATIENT-LVL V: CPT | Mod: PBBFAC,,, | Performed by: INTERNAL MEDICINE

## 2020-08-03 PROCEDURE — 99205 OFFICE O/P NEW HI 60 MIN: CPT | Mod: S$GLB,,, | Performed by: INTERNAL MEDICINE

## 2020-08-03 PROCEDURE — 84443 ASSAY THYROID STIM HORMONE: CPT

## 2020-08-03 PROCEDURE — 36415 COLL VENOUS BLD VENIPUNCTURE: CPT

## 2020-08-03 PROCEDURE — 99999 PR PBB SHADOW E&M-EST. PATIENT-LVL V: ICD-10-PCS | Mod: PBBFAC,,, | Performed by: INTERNAL MEDICINE

## 2020-08-03 PROCEDURE — 83036 HEMOGLOBIN GLYCOSYLATED A1C: CPT

## 2020-08-03 PROCEDURE — 99205 PR OFFICE/OUTPT VISIT, NEW, LEVL V, 60-74 MIN: ICD-10-PCS | Mod: S$GLB,,, | Performed by: INTERNAL MEDICINE

## 2020-08-03 RX ORDER — METOPROLOL TARTRATE 50 MG/1
50 TABLET ORAL DAILY
Status: ON HOLD | COMMUNITY
Start: 2020-07-03 | End: 2020-08-30 | Stop reason: HOSPADM

## 2020-08-03 RX ORDER — LISINOPRIL 20 MG/1
20 TABLET ORAL DAILY
COMMUNITY
Start: 2020-07-15

## 2020-08-03 RX ORDER — NITROGLYCERIN 0.4 MG/1
TABLET SUBLINGUAL
COMMUNITY
Start: 2020-07-03 | End: 2023-09-22

## 2020-08-03 RX ORDER — FLUTICASONE PROPIONATE 50 MCG
2 SPRAY, SUSPENSION (ML) NASAL DAILY
Qty: 16 G | Refills: 11 | Status: SHIPPED | OUTPATIENT
Start: 2020-08-03 | End: 2023-09-22 | Stop reason: SDUPTHER

## 2020-08-03 RX ORDER — AMOXICILLIN AND CLAVULANATE POTASSIUM 875; 125 MG/1; MG/1
1 TABLET, FILM COATED ORAL
Status: ON HOLD | COMMUNITY
End: 2020-08-30 | Stop reason: HOSPADM

## 2020-08-03 NOTE — PATIENT INSTRUCTIONS
You have chronic and severe sinus problems.  Ct brain (part of sinuses shown) was not too bad 11/2019.  You would do better with afrin to open passages if stuffy, and flonase 1-2 each side to outside corner of eye.   Obstructed sinuses will prevent effectiveness of cpap.    Weight gone up - may contribute to less effectiveness sleep apnea treatment.    Bmi is high with comorbid conditions- sleep apnea, diabetes risk- bariatric procedure.    You stopped breathing 61 times an hour, average oxygen saturation 88, with low of 73 during sleep.    Split night study with cpap of 8 cm, fairly low.  Could increase to 12 til re studied.    Medical treatment of sinuses needed.  May benefit from sinus surger- uvulopalatopharngoplasty? Tonsil removal?  Father and brother had these recently.     You may have headaches and non restorative sleep from poorly treated sleep apnea. Headaches and poor bp control likely from sleep apnea.    Treat sinuses, see ent, increase cpap, check thyroid/diabetes screen (if not done), consider weight loss (surgery)?, then do another titration/study.    Bariatric surgery evaluation should be good.    Call for repeat study evaluation, may need bipap??

## 2020-08-03 NOTE — PROGRESS NOTES
"8/3/2020    Shady Snyder  New Patient Consult    Chief Complaint   Patient presents with    Apnea    Sputum Production     white    Shortness of Breath       HPI: had sob in July with covid neg test. Pt had osas- 3/21/2018 study with ahi 61.2 with mean 02 sat 88, with low 79%.  cpap titration done split night 8.  bmi ws 35 with study, now bmi 36.8. wife reports pt having severe snoring with cpap.  Pt uses cpap nightly- uses slo clean. Pt awakens with migranes.   Pt reports non restorative sleep.  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      bp control seems worse.     Sinus always closed- poor sense smell.      The chief compliant  problem is new to me",   PFSH:  Past Medical History:   Diagnosis Date    Diverticulitis     GERD (gastroesophageal reflux disease)     HTN (hypertension)     Kidney stone     Sleep apnea          Past Surgical History:   Procedure Laterality Date    CHOLECYSTECTOMY      FRACTURE SURGERY      GERD      rt foot surgery      VASECTOMY       Social History     Tobacco Use    Smoking status: Never Smoker   Substance Use Topics    Alcohol use: Yes     Alcohol/week: 3.0 standard drinks     Types: 1 Standard drinks or equivalent, 2 Cans of beer per week     Comment: Socially    Drug use: No     Family History   Family history unknown: Yes     Review of patient's allergies indicates:  No Known Allergies    Performance Status:The patient's activity level is functions out of house.      Review of Systems:  a review of eleven systems covering constitutional, Eye, HEENT, Psych, Respiratory, Cardiac, GI, , Musculoskeletal, Endocrine, Dermatologic was negative except for pertinent findings as listed ABOVE and below:  pertinent positive as above, rest is good       Exam:Comprehensive exam done. BP (!) 141/102 (BP Location: Right arm, Patient Position: Sitting)   Pulse 68   Ht 5' 11" (1.803 m)   Wt 119.8 kg (264 lb 1.8 oz)   SpO2 97%   BMI 36.84 kg/m²   Exam " included Vitals as listed, and patient's appearance and affect and alertness and mood, oral exam for yeast and hygiene and pharynx lesions and Mallapatti (M) score, neck with inspection for jvd and masses and thyroid abnormalities and lymph nodes (supraclavicular and infraclavicular nodes and axillary also examined and noted if abn), chest exam included symmetry and effort and fremitus and percussion and auscultation, cardiac exam included rhythm and gallops and murmur and rubs and jvd and edema, abdominal exam for mass and hepatosplenomegaly and tenderness and hernias and bowel sounds, Musculoskeletal exam with muscle tone and posture and mobility/gait and  strength, and skin for rashes and cyanosis and pallor and turgor, extremity for clubbing.  Findings were normal except for pertinent findings listed below:  M3 large tonsils, chest is symmetric, no distress, normal percussion, normal fremitus and good normal breath sounds           Radiographs (ct chest and cxr) reviewed: view by direct vision  Ct brain 11/2019 nasal passages tight    Labs reviewed           PFT was not done       Plan:  Clinical impression is apparently straight forward and impression with management as below.     Shady was seen today for apnea, sputum production and shortness of breath.    Diagnoses and all orders for this visit:    Chronic sinus complaints  -     fluticasone propionate (FLONASE) 50 mcg/actuation nasal spray; 2 sprays (100 mcg total) by Each Nostril route once daily.  -     Ambulatory referral/consult to ENT; Future    Obstructive sleep apnea  -     Ambulatory referral/consult to ENT; Future  -     Ambulatory referral/consult to Bariatric Surgery; Future    Class 2 severe obesity due to excess calories with serious comorbidity and body mass index (BMI) of 36.0 to 36.9 in adult  -     TSH; Future  -     HEMOGLOBIN A1C; Future  -     Ambulatory referral/consult to Bariatric Surgery; Future  -     TSH  -     HEMOGLOBIN  A1C        Follow up in about 6 weeks (around 9/14/2020), or if symptoms worsen or fail to improve.    Discussed with patient above for education the following:      Patient Instructions   You have chronic and severe sinus problems.  Ct brain (part of sinuses shown) was not too bad 11/2019.  You would do better with afrin to open passages if stuffy, and flonase 1-2 each side to outside corner of eye.   Obstructed sinuses will prevent effectiveness of cpap.    Weight gone up - may contribute to less effectiveness sleep apnea treatment.    Bmi is high with comorbid conditions- sleep apnea, diabetes risk- bariatric procedure.    You stopped breathing 61 times an hour, average oxygen saturation 88, with low of 73 during sleep.    Split night study with cpap of 8 cm, fairly low.  Could increase to 12 til re studied.    Medical treatment of sinuses needed.  May benefit from sinus surger- uvulopalatopharngoplasty? Tonsil removal?  Father and brother had these recently.     You may have headaches and non restorative sleep from poorly treated sleep apnea. Headaches and poor bp control likely from sleep apnea.    Treat sinuses, see ent, increase cpap, check thyroid/diabetes screen (if not done), consider weight loss (surgery)?, then do another titration/study.    Bariatric surgery evaluation should be good.    Call for repeat study evaluation, may need bipap??

## 2020-08-03 NOTE — LETTER
August 3, 2020      Kvng Christensen,   [address]  [facility]  [place] [address]           [facility]  [address], [address]  [place] [address]  Phone: [phone]  Fax: [phone]          Patient: Shady Snyder   MR Number: [id number]   YOB: 1975   Date of Visit: 8/3/2020       Dear Dr. Kvng Christensen:    Thank you for referring Shady Snyder to me for evaluation. Attached you will find relevant portions of my assessment and plan of care.    If you have questions, please do not hesitate to call me. I look forward to following Shady Snyder along with you.    Sincerely,    Carter Fontanez MD    Enclosure  CC:  No Recipients    If you would like to receive this communication electronically, please contact [email or web address] or [phone] to request more information on [facility] access.    For providers and/or their staff who would like to refer a patient to [facility], please contact us through our one-stop-shop provider referral line, [facility], at [phone].    If you feel you have received this communication in error or would no longer like to receive these types of communications, please e-mail [email or web address]

## 2020-08-04 ENCOUNTER — TELEPHONE (OUTPATIENT)
Dept: PULMONOLOGY | Facility: CLINIC | Age: 45
End: 2020-08-04

## 2020-08-04 LAB
ESTIMATED AVG GLUCOSE: 117 MG/DL (ref 68–131)
HBA1C MFR BLD HPLC: 5.7 % (ref 4–5.6)

## 2020-08-04 NOTE — TELEPHONE ENCOUNTER
Pt informed  ----- Message from Carter Fontanez MD sent at 8/4/2020 12:22 PM CDT -----  Notify hgb a1c  slighty up 5.7.  Diabetes needs 6.5 level or higher, upper normal 5.6 - could call prediabetes.  Needs exercise/wt loss, etc... not too too bad as near upper normal.

## 2020-08-04 NOTE — TELEPHONE ENCOUNTER
Spoke to pt, 12 is too high for him. Wants to go down to 10. Dr de oliveira wrote on rx pad and it was sent to [facility] resp.  ----- Message from Constance Capone sent at 8/4/2020  8:41 AM CDT -----  Regarding: advice  Contact: DARBY MATTSON [[id number]]  Patient is requesting a call back from the nurse stated his body not accepting the amount of air coming through cpap.    Please call the patient upon request at phone number [phone].

## 2020-08-07 ENCOUNTER — OFFICE VISIT (OUTPATIENT)
Dept: OTOLARYNGOLOGY | Facility: CLINIC | Age: 45
End: 2020-08-07
Payer: COMMERCIAL

## 2020-08-07 VITALS
DIASTOLIC BLOOD PRESSURE: 76 MMHG | BODY MASS INDEX: 36.76 KG/M2 | HEIGHT: 71 IN | OXYGEN SATURATION: 97 % | TEMPERATURE: 98 F | HEART RATE: 67 BPM | RESPIRATION RATE: 18 BRPM | SYSTOLIC BLOOD PRESSURE: 119 MMHG | WEIGHT: 262.56 LBS

## 2020-08-07 DIAGNOSIS — E66.01 MORBID OBESITY: ICD-10-CM

## 2020-08-07 DIAGNOSIS — J35.01 CHRONIC TONSILLITIS: ICD-10-CM

## 2020-08-07 DIAGNOSIS — G47.33 OBSTRUCTIVE SLEEP APNEA: Primary | ICD-10-CM

## 2020-08-07 DIAGNOSIS — R09.89 CHRONIC SINUS COMPLAINTS: ICD-10-CM

## 2020-08-07 PROCEDURE — 99203 PR OFFICE/OUTPT VISIT, NEW, LEVL III, 30-44 MIN: ICD-10-PCS | Mod: S$GLB,,, | Performed by: OTOLARYNGOLOGY

## 2020-08-07 PROCEDURE — 99203 OFFICE O/P NEW LOW 30 MIN: CPT | Mod: S$GLB,,, | Performed by: OTOLARYNGOLOGY

## 2020-08-07 NOTE — PATIENT INSTRUCTIONS
Start using flonase daily. Also start saline spray/saline sinus rinses.     Try to lose some weight to help with sleep apnea.      Will check you back in 3 months to discuss possible tonsillectomy/other measures to treat sleep apnea.

## 2020-08-07 NOTE — PROGRESS NOTES
Subjective:       Patient ID: Shady Snyder is a 45 y.o. male.    Chief Complaint: Advice Only (O2 level Per Dr Fontanez)    HPI     Mr. Snyder is a 46 yo man presenting for evaluation of RAZA, as well as complaints of nasal obstruction. Patients last sleep study was in 2018 - his AHI at that time was 61.2. His BMI is 36.6. He wears a CPAP. It was recommended to increase his pressure support, however he did not tolerate this. He has HTN, and when he does not wear the CPAP this worsens.   Of note he does get frequent strep throat infections, in fact he was tested today by his PCP and he is positive. Currently taking augmentin. In the past it was recommended for him to have a tonsillectomy, however he never had one.  He does report that he often cannot breathe through his nose. No h/o nasal trauma. He thinks he may have frequent sinus infections. CT scan of the head done in 11/2019 with patent, well-aerated sinuses. Does not use any nasal sprays currently.    Review of Systems   Constitutional: Negative for activity change, appetite change, fatigue, fever and unexpected weight change.   HENT: Positive for nasal congestion, facial swelling, sinus pressure/congestion, sore throat and trouble swallowing. Negative for dental problem, ear discharge, ear pain, hearing loss, nosebleeds, postnasal drip, rhinorrhea, sneezing and voice change.    Eyes: Positive for photophobia. Negative for pain and itching.   Respiratory: Positive for shortness of breath and wheezing. Negative for apnea, cough and stridor.    Cardiovascular: Positive for chest pain. Negative for palpitations.   Gastrointestinal: Positive for diarrhea. Negative for abdominal pain, nausea and vomiting.   Endocrine: Negative for cold intolerance and heat intolerance.   Genitourinary: Positive for frequency. Negative for bladder incontinence and dysuria.   Musculoskeletal: Positive for back pain. Negative for arthralgias, myalgias, neck pain and neck  stiffness.   Integumentary:  Negative for pallor, rash and mole/lesion.   Allergic/Immunologic: Negative for food allergies.   Neurological: Positive for dizziness and headaches. Negative for vertigo, seizures and syncope.   Psychiatric/Behavioral: Positive for sleep disturbance. Negative for behavioral problems and confusion.         Objective:      Physical Exam  Constitutional:       General: He is awake.      Appearance: Normal appearance. He is well-developed. He is obese.   HENT:      Head: Normocephalic and atraumatic.      Right Ear: Tympanic membrane, ear canal and external ear normal.      Left Ear: Tympanic membrane, ear canal and external ear normal.      Nose: Nose normal. No septal deviation.      Mouth/Throat:      Pharynx: Uvula midline.      Comments: Gomez tongue position 1  Gomez tonsil size 1-2, mildly erythematous  Appears to have short A-P distance of posterior pharynx  Eyes:      General: Lids are normal. Vision grossly intact.      Conjunctiva/sclera: Conjunctivae normal.   Neck:      Musculoskeletal: Full passive range of motion without pain, normal range of motion and neck supple.      Thyroid: No thyroid mass or thyromegaly.   Pulmonary:      Effort: Pulmonary effort is normal. No tachypnea or respiratory distress.   Skin:     General: Skin is warm and dry.   Neurological:      Mental Status: He is alert and oriented to person, place, and time.   Psychiatric:         Mood and Affect: Mood and affect normal.         Assessment:       1. Obstructive sleep apnea    2. Chronic sinus complaints    3. Morbid obesity    4. Chronic tonsillitis        Plan:       46 yo man with morbid obesity, HTN, severe RAZA, current Strep infection    -I d/w patient that his severe RAZA will not be cured with surgery. He would likely benefit from weight loss. Some suggestions for steps to losing weight were made  -May meet criteria for tonsillectomy for chronic tonsillitis, though I reminded him that I  cannot guarantee that this would help his RAZA. His anatomy suggests that his problem is likely posterior pharynx collapse  -use flonase daily, as well as saline spray/rinse. Sinuses are clear and septum is midline, no indication for surgery.  -RTC in 3 months after this strep infection has cleared, hopefully some weight lost. Will discuss possible tonsillectomy at that time

## 2020-08-11 ENCOUNTER — TELEPHONE (OUTPATIENT)
Dept: PULMONOLOGY | Facility: CLINIC | Age: 45
End: 2020-08-11

## 2020-08-11 DIAGNOSIS — G47.33 OBSTRUCTIVE SLEEP APNEA: Primary | ICD-10-CM

## 2020-08-11 DIAGNOSIS — Z01.818 PREOP EXAMINATION: Primary | ICD-10-CM

## 2020-08-11 NOTE — TELEPHONE ENCOUNTER
covid orders placed, pt informed of titration, covid testing, and to not have any ENT procedures for 3 months until titration is complete----- Message from Carter Fontanez MD sent at 8/11/2020  8:22 AM CDT -----  Notify will place order for cpap titration as no ent procedure for 3 months.  ----- Message -----  From: Coni Rodriguez MD  Sent: 8/7/2020  12:07 PM CDT  To: Carter Fontanez MD

## 2020-08-17 ENCOUNTER — HOSPITAL ENCOUNTER (EMERGENCY)
Facility: HOSPITAL | Age: 45
Discharge: HOME | End: 2020-08-17
Attending: FAMILY MEDICINE
Payer: COMMERCIAL

## 2020-08-17 VITALS
RESPIRATION RATE: 21 BRPM | HEART RATE: 67 BPM | OXYGEN SATURATION: 96 % | HEIGHT: 71 IN | SYSTOLIC BLOOD PRESSURE: 113 MMHG | TEMPERATURE: 98 F | DIASTOLIC BLOOD PRESSURE: 77 MMHG | BODY MASS INDEX: 35.7 KG/M2 | WEIGHT: 255 LBS

## 2020-08-17 DIAGNOSIS — R45.5 AGGRESSIVE OUTBURST: ICD-10-CM

## 2020-08-17 DIAGNOSIS — F41.9 ANXIETY: Primary | ICD-10-CM

## 2020-08-17 DIAGNOSIS — R55 SYNCOPE, UNSPECIFIED SYNCOPE TYPE: ICD-10-CM

## 2020-08-17 PROCEDURE — 70450 CT HEAD/BRAIN W/O DYE: CPT | Mod: 26,,, | Performed by: RADIOLOGY

## 2020-08-17 PROCEDURE — 70450 CT HEAD WITHOUT CONTRAST: ICD-10-PCS | Mod: 26,,, | Performed by: RADIOLOGY

## 2020-08-17 PROCEDURE — 99284 EMERGENCY DEPT VISIT MOD MDM: CPT | Mod: 25

## 2020-08-17 PROCEDURE — 70450 CT HEAD/BRAIN W/O DYE: CPT | Mod: TC

## 2020-08-17 RX ORDER — NAPROXEN SODIUM 220 MG/1
81 TABLET, FILM COATED ORAL DAILY
COMMUNITY
End: 2024-03-11

## 2020-08-17 NOTE — ED NOTES
Pt eloped from room seven after aggressive conversation with ed md. Valente.  Pt would not stop and have conversation with staff on his way out

## 2020-08-17 NOTE — ED PROVIDER NOTES
Encounter Date: 8/17/2020       History     Chief Complaint   Patient presents with    Chest Pain     Patient reports that he had a sharp pain at his desk while he was at work at approx 10AM. Patient reports he then blacked out. Patient reports the chest pain has since resolved but he still feels a soreness. Patient also reports a headache described as intense pressure.    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        Review of patient's allergies indicates:  No Known Allergies  Past Medical History:   Diagnosis Date    Diverticulitis     GERD (gastroesophageal reflux disease)     HTN (hypertension)     Kidney stone     Sleep apnea      Past Surgical History:   Procedure Laterality Date    CHOLECYSTECTOMY      FRACTURE SURGERY      GERD      rt foot surgery      VASECTOMY       Family History   Family history unknown: Yes     Social History     Tobacco Use    Smoking status: Never Smoker    Smokeless tobacco: Never Used   Substance Use Topics    Alcohol use: Yes     Alcohol/week: 3.0 standard drinks     Types: 2 Cans of beer, 1 Standard drinks or equivalent per week     Comment: Socially    Drug use: No     Review of Systems   Constitutional: Negative for fever.   HENT: Negative for sore throat.    Respiratory: Negative for shortness of breath.    Cardiovascular: Negative for chest pain.   Gastrointestinal: Negative for  nausea.   Genitourinary: Negative for dysuria.   Musculoskeletal: Negative for back pain.   Skin: Negative for rash.   Neurological: Negative for weakness.   Hematological: Does not bruise/bleed easily.   Psychiatric/Behavioral: Negative for decreased concentration, hallucinations and sleep disturbance. The patient is nervous/anxious.        Physical Exam     Initial Vitals [08/17/20 1319]   BP Pulse Resp Temp SpO2   (!) 131/96 70 18 98.1 °F (36.7 °C) 96 %      MAP       --         Physical Exam    Nursing note and vitals reviewed.  Constitutional: He appears well-developed and well-nourished. He is not diaphoretic. No distress.   HENT:   Head: Normocephalic and atraumatic.   Right Ear: External ear normal.   Left Ear: External ear normal.   Nose: Nose normal.   Mouth/Throat: Oropharynx is clear and moist. No oropharyngeal exudate.   Eyes: EOM are normal.   Neck: Normal range of motion. Neck supple. No tracheal deviation present.   Cardiovascular: Normal rate and regular rhythm.   No murmur heard.  Pulmonary/Chest: Breath sounds normal. No stridor. No respiratory distress. He has no rales.   Abdominal: Soft. He exhibits no distension and no mass. There is no abdominal tenderness. There is no rebound.   Musculoskeletal: Normal range of motion. No edema.   Lymphadenopathy:     He has no cervical adenopathy.   Neurological: He is alert and oriented to person, place, and time. He has normal strength.   Skin: Skin is warm and dry. Capillary refill takes less than 2 seconds. No pallor.   Psychiatric:   Patient appears anxious with some pressured speech         ED Course   Procedures  Labs Reviewed - No data to display  EKG Readings: (Independently Interpreted)   Rhythm: Normal Sinus Rhythm. Heart Rate: 65. Ectopy: No Ectopy. Conduction: Normal. ST Segments: Normal ST Segments. T Waves: Normal.       Imaging Results          CT Head Without Contrast (Final result)  Result time 08/17/20 14:17:59    Final result by Francisco Javier  "TIM Stauffer MD (08/17/20 14:17:59)                 Impression:      No acute intracranial abnormality.      Electronically signed by: Francisco Javier Stauffer  Date:    08/17/2020  Time:    14:17             Narrative:    EXAMINATION:  CT HEAD WITHOUT CONTRAST    CLINICAL HISTORY:  Syncope, recurrent;    TECHNIQUE:  Low dose axial images were obtained through the head.  Coronal and sagittal reformations were also performed. Contrast was not administered.    COMPARISON:  CT 11/12/2019.    FINDINGS:  There is no acute hemorrhage or infarction.  There is a normal pattern of gray-white matter differentiation.    No extra-axial fluid collections.  Ventricles are normal in size, shape and configuration.  The basal cisterns are patent.    The imaged paranasal sinuses and ethmoid air cells are well aerated.    The mastoid air cells and middle ears are normally pneumatized.                                 Medical Decision Making:   ED Management:  The patient did well in the ED and his blood pressure prior to leaving was normal  As I was explaining to the patient his normal head scan and the review of his records which shows no evidence of high thyroid coronary artery disease or seizure activity, I recommended he see a neurologist for further testing,  he suddenly and unexpectedly became angry stood up, pulled his monitor leads out, at 1 point I felt physically threatened by this large muscled and very angry man and was able to slip around him and exited the room without being struck as he was yelling "you Damn doctors can not tell me anything and I been coming for 3 years"                                 Clinical Impression:       ICD-10-CM ICD-9-CM   1. Anxiety  F41.9 300.00   2. Syncope, unspecified syncope type  R55 780.2   3. Aggressive outburst  R45.5 312.00             ED Disposition Condition    Discharge Stable        ED Prescriptions     None        Follow-up Information    None                                    Josse BRYANT" MD Ambrosio  08/17/20 [id number]

## 2020-08-20 ENCOUNTER — TELEPHONE (OUTPATIENT)
Dept: CARDIOLOGY | Facility: CLINIC | Age: 45
End: 2020-08-20

## 2020-08-20 NOTE — TELEPHONE ENCOUNTER
Left voice mail to return patient's call.  Awaiting return call.        ----- Message from Cruz Mortensen sent at 8/20/2020  9:58 AM CDT -----  Type:  Same Day Appointment Request    Caller is requesting a same day appointment.  Caller declined first available appointment listed below.      Name of Caller:  Patient  When is the first available appointment?  08/24  Symptoms:  ER Follow Up-[facility]---Possible blockage  Best Call Back Number:  [phone]  Additional Information:

## 2020-08-28 ENCOUNTER — HOSPITAL ENCOUNTER (OUTPATIENT)
Facility: HOSPITAL | Age: 45
Discharge: HOME | End: 2020-08-30
Attending: EMERGENCY MEDICINE | Admitting: FAMILY MEDICINE
Payer: COMMERCIAL

## 2020-08-28 DIAGNOSIS — R07.89 ATYPICAL CHEST PAIN: ICD-10-CM

## 2020-08-28 DIAGNOSIS — R55 SYNCOPE, UNSPECIFIED SYNCOPE TYPE: ICD-10-CM

## 2020-08-28 DIAGNOSIS — R55 SYNCOPE: ICD-10-CM

## 2020-08-28 DIAGNOSIS — R07.9 CHEST PAIN: Primary | ICD-10-CM

## 2020-08-28 LAB
ALBUMIN SERPL BCP-MCNC: 4 G/DL (ref 3.5–5.2)
ALP SERPL-CCNC: 73 U/L (ref 55–135)
ALT SERPL W/O P-5'-P-CCNC: 23 U/L (ref 10–44)
AMPHET+METHAMPHET UR QL: NEGATIVE
ANION GAP SERPL CALC-SCNC: 11 MMOL/L (ref 8–16)
AST SERPL-CCNC: 17 U/L (ref 10–40)
BACTERIA #/AREA URNS HPF: NEGATIVE /HPF
BARBITURATES UR QL SCN>200 NG/ML: NEGATIVE
BASOPHILS # BLD AUTO: 0.07 K/UL (ref 0–0.2)
BASOPHILS NFR BLD: 0.7 % (ref 0–1.9)
BENZODIAZ UR QL SCN>200 NG/ML: NEGATIVE
BILIRUB SERPL-MCNC: 0.4 MG/DL (ref 0.1–1)
BILIRUB UR QL STRIP: NEGATIVE
BNP SERPL-MCNC: 19 PG/ML (ref 0–99)
BUN SERPL-MCNC: 19 MG/DL (ref 6–20)
BZE UR QL SCN: NEGATIVE
CALCIUM SERPL-MCNC: 9.5 MG/DL (ref 8.7–10.5)
CANNABINOIDS UR QL SCN: NORMAL
CHLORIDE SERPL-SCNC: 108 MMOL/L (ref 95–110)
CK SERPL-CCNC: 90 U/L (ref 20–200)
CLARITY UR: CLEAR
CO2 SERPL-SCNC: 23 MMOL/L (ref 23–29)
COLOR UR: YELLOW
CREAT SERPL-MCNC: 1.2 MG/DL (ref 0.5–1.4)
CREAT UR-MCNC: 157 MG/DL (ref 23–375)
DIFFERENTIAL METHOD: ABNORMAL
EOSINOPHIL # BLD AUTO: 0.1 K/UL (ref 0–0.5)
EOSINOPHIL NFR BLD: 1.4 % (ref 0–8)
ERYTHROCYTE [DISTWIDTH] IN BLOOD BY AUTOMATED COUNT: 13.9 % (ref 11.5–14.5)
EST. GFR  (AFRICAN AMERICAN): >60 ML/MIN/1.73 M^2
EST. GFR  (NON AFRICAN AMERICAN): >60 ML/MIN/1.73 M^2
GLUCOSE SERPL-MCNC: 109 MG/DL (ref 70–110)
GLUCOSE UR QL STRIP: NEGATIVE
HCT VFR BLD AUTO: 43.3 % (ref 40–54)
HGB BLD-MCNC: 14.1 G/DL (ref 14–18)
HGB UR QL STRIP: NEGATIVE
HYALINE CASTS #/AREA URNS LPF: 1 /LPF
IMM GRANULOCYTES # BLD AUTO: 0.15 K/UL (ref 0–0.04)
IMM GRANULOCYTES NFR BLD AUTO: 1.5 % (ref 0–0.5)
INR PPP: 1
KETONES UR QL STRIP: NEGATIVE
LEUKOCYTE ESTERASE UR QL STRIP: NEGATIVE
LYMPHOCYTES # BLD AUTO: 2 K/UL (ref 1–4.8)
LYMPHOCYTES NFR BLD: 19.3 % (ref 18–48)
MAGNESIUM SERPL-MCNC: 2.2 MG/DL (ref 1.6–2.6)
MCH RBC QN AUTO: 28.8 PG (ref 27–31)
MCHC RBC AUTO-ENTMCNC: 32.6 G/DL (ref 32–36)
MCV RBC AUTO: 89 FL (ref 82–98)
MICROSCOPIC COMMENT: NORMAL
MONOCYTES # BLD AUTO: 0.7 K/UL (ref 0.3–1)
MONOCYTES NFR BLD: 6.4 % (ref 4–15)
NEUTROPHILS # BLD AUTO: 7.3 K/UL (ref 1.8–7.7)
NEUTROPHILS NFR BLD: 70.7 % (ref 38–73)
NITRITE UR QL STRIP: NEGATIVE
NRBC BLD-RTO: 0 /100 WBC
OPIATES UR QL SCN: NEGATIVE
PCP UR QL SCN>25 NG/ML: NEGATIVE
PH UR STRIP: 7 [PH] (ref 5–8)
PLATELET # BLD AUTO: 228 K/UL (ref 150–350)
PMV BLD AUTO: 10.6 FL (ref 9.2–12.9)
POTASSIUM SERPL-SCNC: 4.2 MMOL/L (ref 3.5–5.1)
PROT SERPL-MCNC: 7.5 G/DL (ref 6–8.4)
PROT UR QL STRIP: NEGATIVE
PROTHROMBIN TIME: 12.8 SEC (ref 10.6–14.8)
RBC # BLD AUTO: 4.89 M/UL (ref 4.6–6.2)
RBC #/AREA URNS HPF: 1 /HPF (ref 0–4)
SARS-COV-2 RDRP RESP QL NAA+PROBE: NEGATIVE
SODIUM SERPL-SCNC: 142 MMOL/L (ref 136–145)
SP GR UR STRIP: 1.02 (ref 1–1.03)
SQUAMOUS #/AREA URNS HPF: 0 /HPF
TOXICOLOGY INFORMATION: NORMAL
TROPONIN I SERPL DL<=0.01 NG/ML-MCNC: <0.03 NG/ML
TSH SERPL DL<=0.005 MIU/L-ACNC: 1.84 UIU/ML (ref 0.34–5.6)
URN SPEC COLLECT METH UR: NORMAL
UROBILINOGEN UR STRIP-ACNC: NEGATIVE EU/DL
WBC # BLD AUTO: 10.27 K/UL (ref 3.9–12.7)
WBC #/AREA URNS HPF: 0 /HPF (ref 0–5)

## 2020-08-28 PROCEDURE — G0378 HOSPITAL OBSERVATION PER HR: HCPCS

## 2020-08-28 PROCEDURE — 36415 COLL VENOUS BLD VENIPUNCTURE: CPT

## 2020-08-28 PROCEDURE — 93005 ELECTROCARDIOGRAM TRACING: CPT | Performed by: INTERNAL MEDICINE

## 2020-08-28 PROCEDURE — 84484 ASSAY OF TROPONIN QUANT: CPT

## 2020-08-28 PROCEDURE — 87040 BLOOD CULTURE FOR BACTERIA: CPT

## 2020-08-28 PROCEDURE — U0002 COVID-19 LAB TEST NON-CDC: HCPCS

## 2020-08-28 PROCEDURE — 84443 ASSAY THYROID STIM HORMONE: CPT

## 2020-08-28 PROCEDURE — 80061 LIPID PANEL: CPT

## 2020-08-28 PROCEDURE — 80307 DRUG TEST PRSMV CHEM ANLYZR: CPT

## 2020-08-28 PROCEDURE — 85610 PROTHROMBIN TIME: CPT

## 2020-08-28 PROCEDURE — 81001 URINALYSIS AUTO W/SCOPE: CPT

## 2020-08-28 PROCEDURE — 83036 HEMOGLOBIN GLYCOSYLATED A1C: CPT

## 2020-08-28 PROCEDURE — 99285 EMERGENCY DEPT VISIT HI MDM: CPT | Mod: 25

## 2020-08-28 PROCEDURE — 80053 COMPREHEN METABOLIC PANEL: CPT

## 2020-08-28 PROCEDURE — 83880 ASSAY OF NATRIURETIC PEPTIDE: CPT

## 2020-08-28 PROCEDURE — 84484 ASSAY OF TROPONIN QUANT: CPT | Mod: 91

## 2020-08-28 PROCEDURE — 83735 ASSAY OF MAGNESIUM: CPT

## 2020-08-28 PROCEDURE — 85025 COMPLETE CBC W/AUTO DIFF WBC: CPT

## 2020-08-28 PROCEDURE — 82550 ASSAY OF CK (CPK): CPT

## 2020-08-28 RX ORDER — IBUPROFEN 200 MG
16 TABLET ORAL
Status: DISCONTINUED | OUTPATIENT
Start: 2020-08-28 | End: 2020-08-30 | Stop reason: HOSPADM

## 2020-08-28 RX ORDER — GLUCAGON 1 MG
1 KIT INJECTION
Status: DISCONTINUED | OUTPATIENT
Start: 2020-08-28 | End: 2020-08-30 | Stop reason: HOSPADM

## 2020-08-28 RX ORDER — MORPHINE SULFATE 4 MG/ML
4 INJECTION, SOLUTION INTRAMUSCULAR; INTRAVENOUS EVERY 4 HOURS PRN
Status: DISCONTINUED | OUTPATIENT
Start: 2020-08-28 | End: 2020-08-30 | Stop reason: HOSPADM

## 2020-08-28 RX ORDER — FLUTICASONE PROPIONATE 50 MCG
2 SPRAY, SUSPENSION (ML) NASAL DAILY
Status: DISCONTINUED | OUTPATIENT
Start: 2020-08-29 | End: 2020-08-30 | Stop reason: HOSPADM

## 2020-08-28 RX ORDER — ACETAMINOPHEN 325 MG/1
650 TABLET ORAL EVERY 8 HOURS PRN
Status: DISCONTINUED | OUTPATIENT
Start: 2020-08-28 | End: 2020-08-30 | Stop reason: HOSPADM

## 2020-08-28 RX ORDER — ATORVASTATIN CALCIUM 40 MG/1
40 TABLET, FILM COATED ORAL NIGHTLY
Status: DISCONTINUED | OUTPATIENT
Start: 2020-08-28 | End: 2020-08-30 | Stop reason: HOSPADM

## 2020-08-28 RX ORDER — TALC
6 POWDER (GRAM) TOPICAL NIGHTLY PRN
Status: DISCONTINUED | OUTPATIENT
Start: 2020-08-28 | End: 2020-08-30 | Stop reason: HOSPADM

## 2020-08-28 RX ORDER — MINERAL OIL
180 ENEMA (ML) RECTAL DAILY
COMMUNITY

## 2020-08-28 RX ORDER — NAPROXEN SODIUM 220 MG/1
81 TABLET, FILM COATED ORAL DAILY
Status: DISCONTINUED | OUTPATIENT
Start: 2020-08-29 | End: 2020-08-30 | Stop reason: HOSPADM

## 2020-08-28 RX ORDER — ENOXAPARIN SODIUM 100 MG/ML
40 INJECTION SUBCUTANEOUS EVERY 24 HOURS
Status: DISCONTINUED | OUTPATIENT
Start: 2020-08-28 | End: 2020-08-30 | Stop reason: HOSPADM

## 2020-08-28 RX ORDER — PANTOPRAZOLE SODIUM 40 MG/1
40 TABLET, DELAYED RELEASE ORAL DAILY
COMMUNITY
End: 2023-06-05

## 2020-08-28 RX ORDER — NAPROXEN SODIUM 220 MG/1
324 TABLET, FILM COATED ORAL
Status: COMPLETED | OUTPATIENT
Start: 2020-08-28 | End: 2020-08-29

## 2020-08-28 RX ORDER — POLYETHYLENE GLYCOL 3350 17 G/17G
17 POWDER, FOR SOLUTION ORAL 2 TIMES DAILY PRN
Status: DISCONTINUED | OUTPATIENT
Start: 2020-08-28 | End: 2020-08-30 | Stop reason: HOSPADM

## 2020-08-28 RX ORDER — ONDANSETRON 2 MG/ML
4 INJECTION INTRAMUSCULAR; INTRAVENOUS EVERY 8 HOURS PRN
Status: DISCONTINUED | OUTPATIENT
Start: 2020-08-28 | End: 2020-08-30 | Stop reason: HOSPADM

## 2020-08-28 RX ORDER — CELECOXIB 200 MG/1
200 CAPSULE ORAL DAILY
Status: ON HOLD | COMMUNITY
End: 2020-08-30 | Stop reason: HOSPADM

## 2020-08-28 RX ORDER — PROCHLORPERAZINE EDISYLATE 5 MG/ML
5 INJECTION INTRAMUSCULAR; INTRAVENOUS EVERY 6 HOURS PRN
Status: DISCONTINUED | OUTPATIENT
Start: 2020-08-28 | End: 2020-08-30 | Stop reason: HOSPADM

## 2020-08-28 RX ORDER — IBUPROFEN 200 MG
24 TABLET ORAL
Status: DISCONTINUED | OUTPATIENT
Start: 2020-08-28 | End: 2020-08-30 | Stop reason: HOSPADM

## 2020-08-28 RX ORDER — SODIUM CHLORIDE 0.9 % (FLUSH) 0.9 %
10 SYRINGE (ML) INJECTION
Status: DISCONTINUED | OUTPATIENT
Start: 2020-08-28 | End: 2020-08-30 | Stop reason: HOSPADM

## 2020-08-28 RX ORDER — IPRATROPIUM BROMIDE AND ALBUTEROL SULFATE 2.5; .5 MG/3ML; MG/3ML
3 SOLUTION RESPIRATORY (INHALATION) EVERY 4 HOURS PRN
Status: DISCONTINUED | OUTPATIENT
Start: 2020-08-28 | End: 2020-08-30 | Stop reason: HOSPADM

## 2020-08-28 RX ORDER — METOPROLOL TARTRATE 50 MG/1
50 TABLET ORAL DAILY
Status: DISCONTINUED | OUTPATIENT
Start: 2020-08-29 | End: 2020-08-30 | Stop reason: HOSPADM

## 2020-08-29 ENCOUNTER — HOSPITAL ENCOUNTER (OUTPATIENT)
Dept: RADIOLOGY | Facility: HOSPITAL | Age: 45
Discharge: HOME | End: 2020-08-29
Attending: FAMILY MEDICINE | Admitting: FAMILY MEDICINE
Payer: COMMERCIAL

## 2020-08-29 ENCOUNTER — CLINICAL SUPPORT (OUTPATIENT)
Dept: CARDIOLOGY | Facility: HOSPITAL | Age: 45
End: 2020-08-29
Attending: FAMILY MEDICINE
Payer: COMMERCIAL

## 2020-08-29 ENCOUNTER — HOSPITAL ENCOUNTER (OUTPATIENT)
Dept: RADIOLOGY | Facility: HOSPITAL | Age: 45
Discharge: HOME | End: 2020-08-29
Attending: FAMILY MEDICINE
Payer: COMMERCIAL

## 2020-08-29 VITALS
BODY MASS INDEX: 36.39 KG/M2 | WEIGHT: 259.94 LBS | HEIGHT: 71 IN | BODY MASS INDEX: 36.11 KG/M2 | WEIGHT: 257.94 LBS | HEIGHT: 71 IN

## 2020-08-29 PROBLEM — I10 ESSENTIAL HYPERTENSION: Status: ACTIVE | Noted: 2020-08-29

## 2020-08-29 PROBLEM — R07.9 CHEST PAIN: Status: ACTIVE | Noted: 2020-08-29

## 2020-08-29 PROBLEM — R07.89 ATYPICAL CHEST PAIN: Status: ACTIVE | Noted: 2020-08-29

## 2020-08-29 LAB
ANION GAP SERPL CALC-SCNC: 6 MMOL/L (ref 8–16)
BASOPHILS # BLD AUTO: 0.06 K/UL (ref 0–0.2)
BASOPHILS NFR BLD: 0.7 % (ref 0–1.9)
BUN SERPL-MCNC: 20 MG/DL (ref 6–20)
CALCIUM SERPL-MCNC: 8.7 MG/DL (ref 8.7–10.5)
CHLORIDE SERPL-SCNC: 109 MMOL/L (ref 95–110)
CHOLEST SERPL-MCNC: 203 MG/DL (ref 120–199)
CHOLEST/HDLC SERPL: 5.8 {RATIO} (ref 2–5)
CO2 SERPL-SCNC: 26 MMOL/L (ref 23–29)
CREAT SERPL-MCNC: 1 MG/DL (ref 0.5–1.4)
DIFFERENTIAL METHOD: ABNORMAL
EOSINOPHIL # BLD AUTO: 0.2 K/UL (ref 0–0.5)
EOSINOPHIL NFR BLD: 2 % (ref 0–8)
ERYTHROCYTE [DISTWIDTH] IN BLOOD BY AUTOMATED COUNT: 13.9 % (ref 11.5–14.5)
EST. GFR  (AFRICAN AMERICAN): >60 ML/MIN/1.73 M^2
EST. GFR  (NON AFRICAN AMERICAN): >60 ML/MIN/1.73 M^2
ESTIMATED AVG GLUCOSE: 123 MG/DL (ref 68–131)
FOLATE SERPL-MCNC: >24.8 NG/ML (ref 4–24)
GLUCOSE SERPL-MCNC: 108 MG/DL (ref 70–110)
HBA1C MFR BLD HPLC: 5.9 % (ref 4.5–6.2)
HCT VFR BLD AUTO: 41.7 % (ref 40–54)
HDLC SERPL-MCNC: 35 MG/DL (ref 40–75)
HDLC SERPL: 17.2 % (ref 20–50)
HGB BLD-MCNC: 13.3 G/DL (ref 14–18)
IMM GRANULOCYTES # BLD AUTO: 0.14 K/UL (ref 0–0.04)
IMM GRANULOCYTES NFR BLD AUTO: 1.6 % (ref 0–0.5)
LDLC SERPL CALC-MCNC: 119.8 MG/DL (ref 63–159)
LYMPHOCYTES # BLD AUTO: 2.2 K/UL (ref 1–4.8)
LYMPHOCYTES NFR BLD: 24.1 % (ref 18–48)
MAGNESIUM SERPL-MCNC: 2 MG/DL (ref 1.6–2.6)
MCH RBC QN AUTO: 28.5 PG (ref 27–31)
MCHC RBC AUTO-ENTMCNC: 31.9 G/DL (ref 32–36)
MCV RBC AUTO: 90 FL (ref 82–98)
MONOCYTES # BLD AUTO: 0.7 K/UL (ref 0.3–1)
MONOCYTES NFR BLD: 7.5 % (ref 4–15)
NEUTROPHILS # BLD AUTO: 5.8 K/UL (ref 1.8–7.7)
NEUTROPHILS NFR BLD: 64.1 % (ref 38–73)
NONHDLC SERPL-MCNC: 168 MG/DL
NRBC BLD-RTO: 0 /100 WBC
PHOSPHATE SERPL-MCNC: 4.9 MG/DL (ref 2.7–4.5)
PLATELET # BLD AUTO: 204 K/UL (ref 150–350)
PMV BLD AUTO: 10.9 FL (ref 9.2–12.9)
POTASSIUM SERPL-SCNC: 4.1 MMOL/L (ref 3.5–5.1)
RBC # BLD AUTO: 4.66 M/UL (ref 4.6–6.2)
SODIUM SERPL-SCNC: 141 MMOL/L (ref 136–145)
TRIGL SERPL-MCNC: 241 MG/DL (ref 30–150)
TROPONIN I SERPL DL<=0.01 NG/ML-MCNC: <0.03 NG/ML
TROPONIN I SERPL DL<=0.01 NG/ML-MCNC: <0.03 NG/ML
VIT B12 SERPL-MCNC: 485 PG/ML (ref 210–950)
WBC # BLD AUTO: 8.98 K/UL (ref 3.9–12.7)

## 2020-08-29 PROCEDURE — 83735 ASSAY OF MAGNESIUM: CPT

## 2020-08-29 PROCEDURE — 99900035 HC TECH TIME PER 15 MIN (STAT)

## 2020-08-29 PROCEDURE — 95819 EEG AWAKE AND ASLEEP: CPT

## 2020-08-29 PROCEDURE — 84484 ASSAY OF TROPONIN QUANT: CPT

## 2020-08-29 PROCEDURE — 93018 CV STRESS TEST I&R ONLY: CPT | Mod: ,,, | Performed by: INTERNAL MEDICINE

## 2020-08-29 PROCEDURE — 25000003 PHARM REV CODE 250: Performed by: FAMILY MEDICINE

## 2020-08-29 PROCEDURE — 94761 N-INVAS EAR/PLS OXIMETRY MLT: CPT

## 2020-08-29 PROCEDURE — 36415 COLL VENOUS BLD VENIPUNCTURE: CPT

## 2020-08-29 PROCEDURE — 80048 BASIC METABOLIC PNL TOTAL CA: CPT

## 2020-08-29 PROCEDURE — 85025 COMPLETE CBC W/AUTO DIFF WBC: CPT

## 2020-08-29 PROCEDURE — 93017 CV STRESS TEST TRACING ONLY: CPT

## 2020-08-29 PROCEDURE — 93016 NUCLEAR STRESS TEST (CUPID ONLY): ICD-10-PCS | Mod: ,,, | Performed by: INTERNAL MEDICINE

## 2020-08-29 PROCEDURE — G0378 HOSPITAL OBSERVATION PER HR: HCPCS

## 2020-08-29 PROCEDURE — 82607 VITAMIN B-12: CPT

## 2020-08-29 PROCEDURE — 25000003 PHARM REV CODE 250: Performed by: PHYSICIAN ASSISTANT

## 2020-08-29 PROCEDURE — 25000242 PHARM REV CODE 250 ALT 637 W/ HCPCS: Performed by: FAMILY MEDICINE

## 2020-08-29 PROCEDURE — A9502 TC99M TETROFOSMIN: HCPCS

## 2020-08-29 PROCEDURE — 63600175 PHARM REV CODE 636 W HCPCS: Performed by: FAMILY MEDICINE

## 2020-08-29 PROCEDURE — 82746 ASSAY OF FOLIC ACID SERUM: CPT

## 2020-08-29 PROCEDURE — 84100 ASSAY OF PHOSPHORUS: CPT

## 2020-08-29 PROCEDURE — 93018 NUCLEAR STRESS TEST (CUPID ONLY): ICD-10-PCS | Mod: ,,, | Performed by: INTERNAL MEDICINE

## 2020-08-29 PROCEDURE — 96372 THER/PROPH/DIAG INJ SC/IM: CPT | Mod: 59

## 2020-08-29 PROCEDURE — 93016 CV STRESS TEST SUPVJ ONLY: CPT | Mod: ,,, | Performed by: INTERNAL MEDICINE

## 2020-08-29 PROCEDURE — 93306 TTE W/DOPPLER COMPLETE: CPT

## 2020-08-29 RX ORDER — POTASSIUM CHLORIDE 7.45 MG/ML
20 INJECTION INTRAVENOUS
Status: DISCONTINUED | OUTPATIENT
Start: 2020-08-29 | End: 2020-08-30 | Stop reason: HOSPADM

## 2020-08-29 RX ORDER — POTASSIUM CHLORIDE 7.45 MG/ML
40 INJECTION INTRAVENOUS
Status: DISCONTINUED | OUTPATIENT
Start: 2020-08-29 | End: 2020-08-30 | Stop reason: HOSPADM

## 2020-08-29 RX ORDER — MAGNESIUM SULFATE HEPTAHYDRATE 40 MG/ML
2 INJECTION, SOLUTION INTRAVENOUS
Status: DISCONTINUED | OUTPATIENT
Start: 2020-08-29 | End: 2020-08-30 | Stop reason: HOSPADM

## 2020-08-29 RX ORDER — LANOLIN ALCOHOL/MO/W.PET/CERES
800 CREAM (GRAM) TOPICAL
Status: DISCONTINUED | OUTPATIENT
Start: 2020-08-29 | End: 2020-08-30 | Stop reason: HOSPADM

## 2020-08-29 RX ORDER — POTASSIUM CHLORIDE 20 MEQ/1
40 TABLET, EXTENDED RELEASE ORAL
Status: DISCONTINUED | OUTPATIENT
Start: 2020-08-29 | End: 2020-08-30 | Stop reason: HOSPADM

## 2020-08-29 RX ORDER — RANOLAZINE 500 MG/1
500 TABLET, EXTENDED RELEASE ORAL 2 TIMES DAILY
Status: DISCONTINUED | OUTPATIENT
Start: 2020-08-29 | End: 2020-08-30 | Stop reason: HOSPADM

## 2020-08-29 RX ORDER — POTASSIUM CHLORIDE 20 MEQ/1
20 TABLET, EXTENDED RELEASE ORAL
Status: DISCONTINUED | OUTPATIENT
Start: 2020-08-29 | End: 2020-08-30 | Stop reason: HOSPADM

## 2020-08-29 RX ORDER — MAGNESIUM SULFATE HEPTAHYDRATE 40 MG/ML
4 INJECTION, SOLUTION INTRAVENOUS
Status: DISCONTINUED | OUTPATIENT
Start: 2020-08-29 | End: 2020-08-30 | Stop reason: HOSPADM

## 2020-08-29 RX ORDER — HYDRALAZINE HYDROCHLORIDE 20 MG/ML
5 INJECTION INTRAMUSCULAR; INTRAVENOUS EVERY 4 HOURS PRN
Status: DISCONTINUED | OUTPATIENT
Start: 2020-08-29 | End: 2020-08-30 | Stop reason: HOSPADM

## 2020-08-29 RX ORDER — RANOLAZINE 500 MG/1
500 TABLET, EXTENDED RELEASE ORAL 2 TIMES DAILY
Status: DISCONTINUED | OUTPATIENT
Start: 2020-08-29 | End: 2020-08-29

## 2020-08-29 RX ORDER — MAGNESIUM SULFATE 1 G/100ML
1 INJECTION INTRAVENOUS
Status: DISCONTINUED | OUTPATIENT
Start: 2020-08-29 | End: 2020-08-30 | Stop reason: HOSPADM

## 2020-08-29 RX ORDER — HYDRALAZINE HYDROCHLORIDE 20 MG/ML
10 INJECTION INTRAMUSCULAR; INTRAVENOUS EVERY 4 HOURS PRN
Status: DISCONTINUED | OUTPATIENT
Start: 2020-08-29 | End: 2020-08-30 | Stop reason: HOSPADM

## 2020-08-29 RX ADMIN — ASPIRIN 81 MG CHEWABLE TABLET 81 MG: 81 TABLET CHEWABLE at 11:08

## 2020-08-29 RX ADMIN — MELATONIN 6 MG: at 02:08

## 2020-08-29 RX ADMIN — ENOXAPARIN SODIUM 40 MG: 100 INJECTION SUBCUTANEOUS at 06:08

## 2020-08-29 RX ADMIN — RANOLAZINE 500 MG: 500 TABLET, EXTENDED RELEASE ORAL at 02:08

## 2020-08-29 RX ADMIN — ATORVASTATIN CALCIUM 40 MG: 40 TABLET, FILM COATED ORAL at 09:08

## 2020-08-29 RX ADMIN — RANOLAZINE 500 MG: 500 TABLET, EXTENDED RELEASE ORAL at 09:08

## 2020-08-29 RX ADMIN — FLUTICASONE PROPIONATE 100 MCG: 50 SPRAY, METERED NASAL at 11:08

## 2020-08-29 RX ADMIN — ASPIRIN 81 MG CHEWABLE TABLET 324 MG: 81 TABLET CHEWABLE at 12:08

## 2020-08-29 RX ADMIN — ENOXAPARIN SODIUM 40 MG: 100 INJECTION SUBCUTANEOUS at 12:08

## 2020-08-29 RX ADMIN — ATORVASTATIN CALCIUM 40 MG: 40 TABLET, FILM COATED ORAL at 12:08

## 2020-08-29 RX ADMIN — METOPROLOL TARTRATE 50 MG: 50 TABLET, FILM COATED ORAL at 11:08

## 2020-08-29 NOTE — PLAN OF CARE
Problem: Adult Inpatient Plan of Care  Goal: Absence of Hospital-Acquired Illness or Injury  Outcome: Ongoing, Progressing  Goal: Optimal Comfort and Wellbeing  Outcome: Ongoing, Progressing  Goal: Rounds/Family Conference  Outcome: Ongoing, Progressing

## 2020-08-29 NOTE — PLAN OF CARE
08/29/20 0300   PRE-TX-O2   O2 Device (Oxygen Therapy) room air   SpO2 99 %   Pulse 65   Resp 20   Aerosol Therapy   $ Aerosol Therapy Charges PRN treatment not required   Preset CPAP/BiPAP Settings   Mode Of Delivery   (refused cpap)   Respiratory Evaluation   $ Care Plan Tech Time 15 min   Evaluation For New Orders

## 2020-08-29 NOTE — PROCEDURES
EEG Report    Patient name: Shady GUILLERMO 71    Date of Service: 20    Duration: 30 minutes    Requested By: Sandy Nieto NP                                                                                    Reading Physician: Fernando Hummel M.D.        Reason for Study: Syncope.         Clinical History: 44yo man who presented with syncopal episodes.        AED/sedation: none      Exam Notes:  The recording was performed with the standard 10-20 electrode placement with additional ECG electrodes.     Summary:    There is a posterior dominant rhythm of 10 Hz which attenuates during drowsiness. Stage II sleep structures are seen.    Photic stimulation is performed with no abnormal reactivity noted. Hyperventilation is not performed.     No epileptiform discharges or seizures are captured.        Impression:      This is a normal awake and asleep routine EEG.

## 2020-08-29 NOTE — PROGRESS NOTES
[facility]  Progress Note    Patient name: Shady Snyder  MRN: [id number]  Admit Date: 8/28/2020   LOS: 0 days     SUBJECTIVE:     Principal problem: Atypical chest pain    Interval History:  Admitted for atypical chest pain with syncope. Known HTN. Sent in here by cardiology.    No acute overnight events reported. Does have intermittent sharp non-radiating precordial chest pain with no exacerbating or relieving factors.    Scheduled Meds:   aspirin  81 mg Oral Daily    atorvastatin  40 mg Oral QHS    enoxaparin  40 mg Subcutaneous Q24H    fluticasone propionate  2 spray Each Nostril Daily    metoprolol tartrate  50 mg Oral Daily    ranolazine  500 mg Oral BID     Continuous Infusions:  PRN Meds:acetaminophen, albuterol-ipratropium, dextrose 50%, dextrose 50%, glucagon (human recombinant), glucose, glucose, hydrALAZINE, hydrALAZINE, magnesium oxide, magnesium sulfate IVPB, magnesium sulfate IVPB, magnesium sulfate IVPB, magnesium sulfate IVPB, melatonin, morphine, ondansetron, polyethylene glycol, potassium chloride in water, potassium chloride in water, potassium chloride in water, potassium chloride in water, potassium chloride, potassium chloride, potassium chloride, potassium chloride, prochlorperazine, sodium chloride 0.9%    Review of patient's allergies indicates:  No Known Allergies    Review of Systems: As per interval history    OBJECTIVE:     Vital Signs (Most Recent)  Temp: 97.7 °F (36.5 °C) (08/29/20 1535)  Pulse: 62 (08/29/20 1535)  Resp: 18 (08/29/20 1535)  BP: 120/75 (08/29/20 1535)  SpO2: 99 % (08/29/20 1535)    Vital Signs Range (Last 24H):  Temp:  [97.7 °F (36.5 °C)-98.4 °F (36.9 °C)]   Pulse:  [59-90]   Resp:  [14-37]   BP: (106-158)/(60-96)   SpO2:  [95 %-100 %]     I & O (Last 24H):    Intake/Output Summary (Last 24 hours) at 8/29/2020 1841  Last data filed at 8/29/2020 1500  Gross per 24 hour   Intake 240 ml   Output 350 ml   Net -110 ml       Physical  Exam:  General: Patient resting comfortably in no acute distress. Appears as stated age. Obese  Eyes: No conjunctival injection. No scleral icterus.  ENT: Hearing grossly intact. No discharge from ears. No nasal discharge.   Neck: Supple, trachea midline. No JVD  CVS: RRR. No LE edema BL  Lungs: CTA BL, no wheezing or crackles. Good breath sounds. No accessory muscle use. No acute respiratory distress  Abdomen:  Soft, nontender and nondistended.  No organomegaly  Neuro: AOx3. Moves all extremities. Follows commands. Responds appropriately   Skin:  No rash or erythema noted    Laboratory:  CBC:   Recent Labs   Lab 08/29/20  0521   WBC 8.98   RBC 4.66   HGB 13.3*   HCT 41.7      MCV 90   MCH 28.5   MCHC 31.9*     CMP:   Recent Labs   Lab 08/28/20  1732 08/29/20  0521    108   CALCIUM 9.5 8.7   ALBUMIN 4.0  --    PROT 7.5  --     141   K 4.2 4.1   CO2 23 26    109   BUN 19 20   CREATININE 1.2 1.0   ALKPHOS 73  --    ALT 23  --    AST 17  --    BILITOT 0.4  --      Cardiac markers:   Recent Labs   Lab 08/29/20  0521   TROPONINI <0.030       Diagnostic Results:  Labs: Reviewed  ECG: Reviewed    ASSESSMENT/PLAN:       Active Hospital Problems    Diagnosis  POA    *Atypical chest pain [R07.89]  Yes    Essential hypertension [I10]  Yes    Syncope [R55]  Yes      Resolved Hospital Problems   No resolved problems to display.       Plan:   Unclear etiology - reports what seems like hx of viral carditis   Trop x 3 - wnl   Appreciate cardiology input   2 day stress test is in progress. Echo read pending   Continue daily aspirin, statin and beta blocker  Started on ranolazine   Seen by neurology - MRI/MRA/EEG - normal. US of bilateral carotids - no hemodynamically significant stenosis       Dispo: Home tmrw pending stress test results     VTE Risk Mitigation (From admission, onward)         Ordered     enoxaparin injection 40 mg  Every 24 hours      08/28/20 2044     IP VTE HIGH RISK PATIENT  Once       08/28/20 2044     Place sequential compression device  Until discontinued      08/28/20 2044                  Department Hospital Medicine  [facility]  Curtis Ortega MD  Date of service: 08/29/2020

## 2020-08-29 NOTE — H&P
"Critical access hospital Medicine  History & Physical    Patient Name: Shady Snyder  MRN: [id number]  Admission Date: 8/28/2020  Attending Physician: Domenica Barnett MD   Primary Care Provider: Jac Varma MD    Patient information was obtained from patient, past medical records, ED physician and ER records.     Subjective:     Principal Problem:Chest pain    Chief Complaint:   Chief Complaint   Patient presents with    Chest Pain        HPI: 44 yo CM with PMF of HTN, RAZA presents with chest pain.  Occurring intermittently over the past month, with worsening frequency and severity  Now accompanied by syncopal events  CP described as L sided, squeezing pressure  Radiates to jaw and L arm  Then pt "blacks out" for a few seconds  Also experiences tongue biting and numbness around his mouth  +L arm numbness and weakness  No confusion before or after  No SOB, no palpitations, no edema  +intermittent slurred speech, no dysphagia  He has been reluctant to stop driving despite syncopal episodes  10 days ago was seen by [facility] ED with nl head CT  However his CP continued to reoccur with syncopal episodes  He spoke to his cardiologist who said to come here for admission    Pt also reports he's been seen recently by his PCP for:  Thyroid disorder  Strep throat and sinusitis  Low testosterone  Possible AV suresh blockage (per pt's report)  However he is on metoprolol  And he says he was given Celebrex for chest pain  Also with history of costochondritis  However he says this current squeezing pressure chest pain is new and different to previous pain    Past Medical History:   Diagnosis Date    Diverticulitis     GERD (gastroesophageal reflux disease)     HTN (hypertension)     Kidney stone     Sleep apnea      Past Surgical History:   Procedure Laterality Date    CHOLECYSTECTOMY      FRACTURE SURGERY      GERD      rt foot surgery      VASECTOMY         Review of patient's allergies indicates:  No Known " Allergies    No current facility-administered medications on file prior to encounter.      Current Outpatient Medications on File Prior to Encounter   Medication Sig    albuterol 90 mcg/actuation HFAA Inhale 2 puffs into the lungs every 6 (six) hours as needed.    aspirin 81 MG Chew Take 81 mg by mouth once daily.    celecoxib (CELEBREX) 200 MG capsule Take 200 mg by mouth once daily.    fexofenadine (ALLEGRA) 180 MG tablet Take 180 mg by mouth once daily.    lisinopriL (PRINIVIL,ZESTRIL) 20 MG tablet Take 20 mg by mouth once daily.    metoprolol tartrate (LOPRESSOR) 50 MG tablet Take 50 mg by mouth once daily.    multivitamin (MULTIVITAMIN) per tablet Take 1 tablet by mouth once daily.      pantoprazole (PROTONIX) 40 MG tablet Take 40 mg by mouth once daily.    amoxicillin-clavulanate 875-125mg (AUGMENTIN) 875-125 mg per tablet Take 1 tablet by mouth every 12 (twelve) hours.    fluticasone propionate (FLONASE) 50 mcg/actuation nasal spray 2 sprays (100 mcg total) by Each Nostril route once daily.    HYDROcodone-acetaminophen (NORCO) 5-325 mg per tablet Take 1 tablet by mouth every 8 (eight) hours as needed for Pain. (Patient not taking: Reported on 8/3/2020)    Lactobacillus rhamnosus GG (CULTURELLE) 10 billion cell capsule Take 1 capsule by mouth once daily.    naproxen (EC NAPROSYN) 500 MG EC tablet Take 1 tablet (500 mg total) by mouth 2 (two) times daily with meals. (Patient not taking: Reported on 8/3/2020)    nitroGLYCERIN (NITROSTAT) 0.4 MG SL tablet PLACE 1 TAB UNDER THE TONGUE EVERY 5 MINS FOR CHEST PAIN IF NO RELIEF AFTER 3 DOSES DIAL 911     Family History     Family history is unknown by patient.        Tobacco Use    Smoking status: Never Smoker    Smokeless tobacco: Never Used   Substance and Sexual Activity    Alcohol use: Yes     Alcohol/week: 3.0 standard drinks     Types: 2 Cans of beer, 1 Standard drinks or equivalent per week     Comment: Socially    Drug use: No    Sexual  activity: Not on file     Review of Systems     All systems reviewed and are negative except as noted per above.    Objective:     Vital Signs (Most Recent):  Temp: 98.4 °F (36.9 °C) (08/29/20 0000)  Pulse: 64 (08/29/20 0000)  Resp: 20 (08/29/20 0000)  BP: (!) 153/90 (08/29/20 0000)  SpO2: 100 % (08/29/20 0000) Vital Signs (24h Range):  Temp:  [98.4 °F (36.9 °C)-98.5 °F (36.9 °C)] 98.4 °F (36.9 °C)  Pulse:  [59-84] 64  Resp:  [19-37] 20  SpO2:  [95 %-100 %] 100 %  BP: (116-153)/(66-96) 153/90     Weight: 117.9 kg (260 lb)  Body mass index is 36.26 kg/m².    Physical Exam    Gen: alert, responsive  HEENT:  Eyes - no pallor  External ears with no lesions  Nares patent  Mouth - lips chapped  CV: RRR  Lungs: CTA B/L  Abd: +BS, soft, NT, ND  Ext: no atrophy or edema  Skin: warm, dry  Neuro: no focal deficits   Psych: pleasant     Significant Labs:   CBC:   Recent Labs   Lab 08/28/20  1732   WBC 10.27   HGB 14.1   HCT 43.3        CMP:   Recent Labs   Lab 08/28/20  1732      K 4.2      CO2 23      BUN 19   CREATININE 1.2   CALCIUM 9.5   PROT 7.5   ALBUMIN 4.0   BILITOT 0.4   ALKPHOS 73   AST 17   ALT 23   ANIONGAP 11   EGFRNONAA >60.0     Cardiac Markers:   Recent Labs   Lab 08/28/20  1732   BNP 19     Troponin:   Recent Labs   Lab 08/28/20  1732 08/28/20  2349   TROPONINI <0.030 <0.030     TSH:   Recent Labs   Lab 08/28/20  1732   TSH 1.840       Significant Imaging:     Imaging Results          US Carotid Bilateral (Final result)  Result time 08/28/20 20:46:00    Final result by Tyrone Caballero MD (08/28/20 20:46:00)                 Narrative:    History: 45-year-old male with syncope x2 weeks.    PROCEDURE: Carotid arterial vascular evaluation August 28, 2020. Grayscale, color flow and Doppler images provided.    COMPARISON: None.    FINDINGS:  Minimal plaque noted in the bilateral carotid arteries. Doppler waveform analysis bilaterally is normal.    Peak systolic velocity right common carotid  artery 98.6 cm/s, end-diastolic velocity 26.9 cm/s.  Peak systolic velocity right internal carotid artery 74.4 cm/s, end-diastolic velocity 29.1 cm/s.  Antegrade flow present right vertebral artery.  Right ICA to CCA ratio systolic 0.8, end-diastolic 1.1.        Peak systolic velocity left common carotid artery 105.8 cm/s, end-diastolic velocity 26.7 cm/s.  Peak systolic velocity left internal carotid artery 77.0 cm/s, end-diastolic velocity 35.2 cm/s.  Antegrade flow present left vertebral artery.  Left ICA to CCA ratio systolic 0.7, end-diastolic 1.3.    IMPRESSION:  1. No significant stenotic lesion identified within the extracranial carotid arteries bilaterally.  2. Antegrade flow present bilateral vertebral arteries.    Electronically signed by:  Tyrone Caballero MD  8/28/2020 9:54 PM CDT Workstation: [id number]                             X-Ray Chest PA And Lateral (Final result)  Result time 08/28/20 17:34:53   Procedure changed from X-Ray Chest AP Portable     Final result by Bernardo Vazquez MD (08/28/20 17:34:53)                 Narrative:    2 view chest    CLINICAL DATA: Chest pain    FINDINGS: PA and lateral views are compared to March 2017.    Heart size is normal. The mediastinum is unremarkable. There are no  infiltrates or effusions. There is mild linear scarring in the right  middle lobe, unchanged. No acute osseous abnormality is demonstrated.    IMPRESSION:  1. No acute radiographic abnormalities.  2. Mild linear scarring in the right middle lobe.    Electronically Signed by Zay Vazquez M.D. on 8/28/2020 5:38 PM                                Assessment/Plan:     Chest pain with syncope  - neurology and cardiology consulted, thank you  - echo, CUS, EKG, telemetry  - trending troponin  - A1c, lipids, TSH  - CT head reviewed  - lexiscan (however pt took metoprolol today)    Other chronic conditions: home meds as appropriate  Electrolyte derangement:  Replacement prn  VTE ppx: lovenox  FULL  CODE      VTE Risk Mitigation (From admission, onward)         Ordered     enoxaparin injection 40 mg  Every 24 hours      08/28/20 2044     IP VTE HIGH RISK PATIENT  Once      08/28/20 2044     Place sequential compression device  Until discontinued      08/28/20 2044              Domenica Barnett MD  Department of Hospital Medicine   [facility]

## 2020-08-29 NOTE — ED PROVIDER NOTES
Encounter Date: 8/28/2020       History     Chief Complaint   Patient presents with    Chest Pain     Patient with approximately a month history of recurrent syncopal episodes.  Patient also reports his left arm gets weak and numb at times.  Adama some of these episodes he had biting of his mouth and tongue with no clear seizure-like activity.  No postictal state.  Patient with increasing more frequent episodes.  Patient was evaluated at another emergency department approximately 10 days ago with negative workup including head C T. patient called his cardiologist and told to come here for further evaluation.  Patient also with chest pain. No pleurisy hemoptysis.  No significant shortness of breath.        Review of patient's allergies indicates:  No Known Allergies  Past Medical History:   Diagnosis Date    Diverticulitis     GERD (gastroesophageal reflux disease)     HTN (hypertension)     Kidney stone     Sleep apnea      Past Surgical History:   Procedure Laterality Date    CHOLECYSTECTOMY      FRACTURE SURGERY      GERD      rt foot surgery      VASECTOMY       Family History   Family history unknown: Yes     Social History     Tobacco Use    Smoking status: Never Smoker    Smokeless tobacco: Never Used   Substance Use Topics    Alcohol use: Yes     Alcohol/week: 3.0 standard drinks     Types: 2 Cans of beer, 1 Standard drinks or equivalent per week     Comment: Socially    Drug use: No     Review of Systems   Constitutional: Negative for chills and fever.   HENT: Negative for sore throat.    Eyes: Negative for photophobia and visual disturbance.   Respiratory: Negative for shortness of breath.    Cardiovascular: Positive for chest pain.   Gastrointestinal: Negative for abdominal pain and vomiting.   Genitourinary: Negative for dysuria.   Musculoskeletal: Negative for joint swelling.   Skin: Negative for rash.   Neurological: Positive for syncope and weakness. Negative for headaches.    Psychiatric/Behavioral: Negative for confusion.       Physical Exam     Initial Vitals [08/28/20 1709]   BP Pulse Resp Temp SpO2   128/77 84 19 98.5 °F (36.9 °C) 97 %      MAP       --         Physical Exam    Nursing note and vitals reviewed.  Constitutional: He is not diaphoretic. No distress.   HENT:   Head: Normocephalic and atraumatic.   Eyes: Conjunctivae are normal.   Neck: Normal range of motion.   Cardiovascular: Regular rhythm.   Pulmonary/Chest: Breath sounds normal.   Abdominal: Soft. There is no abdominal tenderness.   Musculoskeletal: Normal range of motion.      Comments: All extremities neurovascular intact with no swelling. 2+ pulses all extremities.   Neurological: He is oriented to person, place, and time. He has normal strength. No cranial nerve deficit or sensory deficit.   Skin: No rash noted.   Psychiatric: He has a normal mood and affect.         ED Course   Procedures  Labs Reviewed   CBC W/ AUTO DIFFERENTIAL - Abnormal; Notable for the following components:       Result Value    Immature Granulocytes 1.5 (*)     Immature Grans (Abs) 0.15 (*)     All other components within normal limits   COMPREHENSIVE METABOLIC PANEL   B-TYPE NATRIURETIC PEPTIDE   TROPONIN I   PROTIME-INR   MAGNESIUM   CK   TSH   B-TYPE NATRIURETIC PEPTIDE   URINALYSIS, REFLEX TO URINE CULTURE   DRUG SCREEN PANEL, URINE EMERGENCY   SARS-COV-2 RNA AMPLIFICATION, QUAL          Imaging Results          X-Ray Chest PA And Lateral (Final result)  Result time 08/28/20 17:34:53   Procedure changed from X-Ray Chest AP Portable     Final result by Bernardo Vazquez MD (08/28/20 17:34:53)                 Narrative:    2 view chest    CLINICAL DATA: Chest pain    FINDINGS: PA and lateral views are compared to March 2017.    Heart size is normal. The mediastinum is unremarkable. There are no  infiltrates or effusions. There is mild linear scarring in the right  middle lobe, unchanged. No acute osseous abnormality is  demonstrated.    IMPRESSION:  1. No acute radiographic abnormalities.  2. Mild linear scarring in the right middle lobe.    Electronically Signed by Zay Vazquez M.D. on 8/28/2020 5:38 PM                               Medical Decision Making:   History:   Old Medical Records: I decided to obtain old medical records.  Clinical Tests:   Lab Tests: Reviewed  Radiological Study: Reviewed  Medical Tests: Reviewed  ED Management:  Patient presents with multiple complaints. These include syncope, chest pain and left arm paresthesias.  Patient with normal neuro imaging approximately 10 days ago.  During continue symptoms no need to repeat CT scan of the head now.  Workup is unremarkable.  Patient's cardiologist told patient to come to the emergency room for admission.  Hospitalist consult for admission.                                 Clinical Impression:       ICD-10-CM ICD-9-CM   1. Syncope, unspecified syncope type  R55 780.2   2. Chest pain  R07.9 786.50                                Ervin Graves MD  08/28/20 1954

## 2020-08-29 NOTE — PLAN OF CARE
08/29/20 1309   Patient Assessment/Suction   Level of Consciousness (AVPU) alert   All Lung Fields Breath Sounds clear   PRE-TX-O2   O2 Device (Oxygen Therapy) room air   SpO2 97 %   Pulse Oximetry Type Intermittent   $ Pulse Oximetry - Multiple Charge Pulse Oximetry - Multiple   Pulse 82   Resp 16   Aerosol Therapy   $ Aerosol Therapy Charges PRN treatment not required   Respiratory Evaluation   $ Care Plan Tech Time 15 min

## 2020-08-29 NOTE — CONSULTS
[facility]   Cardiology Note    Consult Requested By: Hospital Medicine  Reason for Consult: CP/Syncope    SUBJECTIVE:     History of Present Illness:  This is a 45-year-old male with a past medical history of hypertension who was evaluated by telemedicine visit with Dr. Manzo with continued complaints of left-sided chest pain and syncope. The patient was instructed to present to the emergency room for further evaluation. The patient states over the last 3-4 weeks he has been experiencing episodes of acute syncope without regular preceding symptoms. He did she states he has been experiencing a left-sided chest pressure with no radiation, associated symptoms, or exacerbating/relieving factors.  He does state he can reproduce the pain upon palpation but did not state this during his telemedicine visit yesterday. He was scheduled for outpatient stress test and echocardiogram but due to the nature of the patient's symptoms, we instructed him to present to the ER to be admitted and to undergo inpatient testing. Awaiting official results of both studies. On admission otherwise, vital signs stable on current medical regimen with negative orthostatics. Cardiac enzymes negative x2 with unremarkable BNP. CV-19 negative. Carotid ultrasound, MRI/MRA head, EKG, and chest x-ray unremarkable. No significant lab abnormalities. Of note, patient is positive for marijuana on Utox. LDL mildly elevated 120 but A1C controlled 5.9%. Patient does report he was prescribed celecoxib for chest pain approximately 1 month ago by his primary physician for pleuritic symptoms with relief. The patient states his current chest pain however is slightly different from that of 1 month ago.    Review of patient's allergies indicates:  No Known Allergies    Past Medical History:   Diagnosis Date    Diverticulitis     GERD (gastroesophageal reflux disease)     HTN (hypertension)     Kidney stone     Sleep apnea      Past Surgical History:    Procedure Laterality Date    CHOLECYSTECTOMY      FRACTURE SURGERY      GERD      rt foot surgery      VASECTOMY       Family History   Family history unknown: Yes     Social History     Tobacco Use    Smoking status: Never Smoker    Smokeless tobacco: Never Used   Substance Use Topics    Alcohol use: Yes     Alcohol/week: 3.0 standard drinks     Types: 2 Cans of beer, 1 Standard drinks or equivalent per week     Comment: Socially    Drug use: No       Review of Systems:  Review of Systems   Cardiovascular: Positive for chest pain.   Neurological: Positive for loss of consciousness.   All other systems reviewed and are negative.      OBJECTIVE:     Vital Signs (Most Recent)  Temp: 97.9 °F (36.6 °C) (08/29/20 1142)  Pulse: 82 (08/29/20 1309)  Resp: 16 (08/29/20 1309)  BP: 119/88 (08/29/20 1142)  SpO2: 97 % (08/29/20 1309)    Vital Signs Range (Last 24H):  Temp:  [97.7 °F (36.5 °C)-98.5 °F (36.9 °C)]   Pulse:  [59-90]   Resp:  [14-37]   BP: (106-158)/(60-96)   SpO2:  [95 %-100 %]     I & O (Last 24H):    Intake/Output Summary (Last 24 hours) at 8/29/2020 1318  Last data filed at 8/29/2020 0900  Gross per 24 hour   Intake --   Output 350 ml   Net -350 ml       Current Diet:     Current Diet Order   Procedures    Diet Cardiac        Allergies:  Review of patient's allergies indicates:  No Known Allergies    Meds:  Scheduled Meds:   aspirin  81 mg Oral Daily    atorvastatin  40 mg Oral QHS    enoxaparin  40 mg Subcutaneous Q24H    fluticasone propionate  2 spray Each Nostril Daily    metoprolol tartrate  50 mg Oral Daily     Continuous Infusions:  PRN Meds:acetaminophen, albuterol-ipratropium, dextrose 50%, dextrose 50%, glucagon (human recombinant), glucose, glucose, hydrALAZINE, hydrALAZINE, magnesium oxide, magnesium sulfate IVPB, magnesium sulfate IVPB, magnesium sulfate IVPB, magnesium sulfate IVPB, melatonin, morphine, ondansetron, polyethylene glycol, potassium chloride in water, potassium  chloride in water, potassium chloride in water, potassium chloride in water, potassium chloride, potassium chloride, potassium chloride, potassium chloride, prochlorperazine, sodium chloride 0.9%    Oxygen/Ventilator Data (Last 24H):  (if applicable)   Resp Rate Total:  [18 br/min] 18 br/min    Hemodynamic Parameters (Last 24H):   (if applicable)        Laboratory and Radiology Data:  Recent Results (from the past 24 hour(s))   CBC auto differential    Collection Time: 08/28/20  5:32 PM   Result Value Ref Range    WBC 10.27 3.90 - 12.70 K/uL    RBC 4.89 4.60 - 6.20 M/uL    Hemoglobin 14.1 14.0 - 18.0 g/dL    Hematocrit 43.3 40.0 - 54.0 %    Mean Corpuscular Volume 89 82 - 98 fL    Mean Corpuscular Hemoglobin 28.8 27.0 - 31.0 pg    Mean Corpuscular Hemoglobin Conc 32.6 32.0 - 36.0 g/dL    RDW 13.9 11.5 - 14.5 %    Platelets 228 150 - 350 K/uL    MPV 10.6 9.2 - 12.9 fL    Immature Granulocytes 1.5 (H) 0.0 - 0.5 %    Gran # (ANC) 7.3 1.8 - 7.7 K/uL    Immature Grans (Abs) 0.15 (H) 0.00 - 0.04 K/uL    Lymph # 2.0 1.0 - 4.8 K/uL    Mono # 0.7 0.3 - 1.0 K/uL    Eos # 0.1 0.0 - 0.5 K/uL    Baso # 0.07 0.00 - 0.20 K/uL    nRBC 0 0 /100 WBC    Gran% 70.7 38.0 - 73.0 %    Lymph% 19.3 18.0 - 48.0 %    Mono% 6.4 4.0 - 15.0 %    Eosinophil% 1.4 0.0 - 8.0 %    Basophil% 0.7 0.0 - 1.9 %    Differential Method Automated    Comprehensive metabolic panel    Collection Time: 08/28/20  5:32 PM   Result Value Ref Range    Sodium 142 136 - 145 mmol/L    Potassium 4.2 3.5 - 5.1 mmol/L    Chloride 108 95 - 110 mmol/L    CO2 23 23 - 29 mmol/L    Glucose 109 70 - 110 mg/dL    BUN, Bld 19 6 - 20 mg/dL    Creatinine 1.2 0.5 - 1.4 mg/dL    Calcium 9.5 8.7 - 10.5 mg/dL    Total Protein 7.5 6.0 - 8.4 g/dL    Albumin 4.0 3.5 - 5.2 g/dL    Total Bilirubin 0.4 0.1 - 1.0 mg/dL    Alkaline Phosphatase 73 55 - 135 U/L    AST 17 10 - 40 U/L    ALT 23 10 - 44 U/L    Anion Gap 11 8 - 16 mmol/L    eGFR if African American >60.0 >60 mL/min/1.73 m^2    eGFR  if non African American >60.0 >60 mL/min/1.73 m^2   Brain natriuretic peptide    Collection Time: 08/28/20  5:32 PM   Result Value Ref Range    BNP 19 0 - 99 pg/mL   Troponin I    Collection Time: 08/28/20  5:32 PM   Result Value Ref Range    Troponin I <0.030 <=0.040 ng/mL   Protime-INR    Collection Time: 08/28/20  5:32 PM   Result Value Ref Range    PT 12.8 10.6 - 14.8 sec    INR 1.0    CK    Collection Time: 08/28/20  5:32 PM   Result Value Ref Range    CPK 90 20 - 200 U/L   Magnesium    Collection Time: 08/28/20  5:32 PM   Result Value Ref Range    Magnesium 2.2 1.6 - 2.6 mg/dL   TSH    Collection Time: 08/28/20  5:32 PM   Result Value Ref Range    TSH 1.840 0.340 - 5.600 uIU/mL   COVID-19 Rapid Screening    Collection Time: 08/28/20  8:50 PM   Result Value Ref Range    SARS-CoV-2 RNA, Amplification, Qual Negative Negative   Urinalysis, Reflex to Urine Culture Urine, Clean Catch    Collection Time: 08/28/20 10:02 PM    Specimen: Urine   Result Value Ref Range    Specimen UA Urine, Clean Catch     Color, UA Yellow Yellow, Straw, Jasmin    Appearance, UA Clear Clear    pH, UA 7.0 5.0 - 8.0    Specific Gravity, UA 1.025 1.005 - 1.030    Protein, UA Negative Negative    Glucose, UA Negative Negative    Ketones, UA Negative Negative    Bilirubin (UA) Negative Negative    Occult Blood UA Negative Negative    Nitrite, UA Negative Negative    Urobilinogen, UA Negative Negative EU/dL    Leukocytes, UA Negative Negative   Drug screen panel, emergency    Collection Time: 08/28/20 10:02 PM   Result Value Ref Range    Benzodiazepines Negative     Cocaine (Metab.) Negative     Opiate Scrn, Ur Negative     Barbiturate Screen, Ur Negative     Amphetamine Screen, Ur Negative     THC Presumptive Positive     Phencyclidine Negative     Creatinine, Random Ur 157.0 23.0 - 375.0 mg/dL    Toxicology Information SEE COMMENT    Urinalysis Microscopic    Collection Time: 08/28/20 10:02 PM   Result Value Ref Range    RBC, UA 1 0 - 4 /hpf     WBC, UA 0 0 - 5 /hpf    Bacteria Negative None-Occ /hpf    Squam Epithel, UA 0 /hpf    Hyaline Casts, UA 1 0-1/lpf /lpf    Microscopic Comment SEE COMMENT    Hemoglobin A1c    Collection Time: 08/28/20 11:49 PM   Result Value Ref Range    Hemoglobin A1C 5.9 4.5 - 6.2 %    Estimated Avg Glucose 123 68 - 131 mg/dL   Blood culture    Collection Time: 08/28/20 11:49 PM    Specimen: Antecubital, Right; Blood   Result Value Ref Range    Blood Culture, Routine No Growth to date    Blood culture    Collection Time: 08/28/20 11:49 PM    Specimen: Blood   Result Value Ref Range    Blood Culture, Routine No Growth to date    Lipid panel    Collection Time: 08/28/20 11:49 PM   Result Value Ref Range    Cholesterol 203 (H) 120 - 199 mg/dL    Triglycerides 241 (H) 30 - 150 mg/dL    HDL 35 (L) 40 - 75 mg/dL    LDL Cholesterol 119.8 63.0 - 159.0 mg/dL    Hdl/Cholesterol Ratio 17.2 (L) 20.0 - 50.0 %    Total Cholesterol/HDL Ratio 5.8 (H) 2.0 - 5.0    Non-HDL Cholesterol 168 mg/dL   Troponin I    Collection Time: 08/28/20 11:49 PM   Result Value Ref Range    Troponin I <0.030 <=0.040 ng/mL   Basic Metabolic Panel (BMP)    Collection Time: 08/29/20  5:21 AM   Result Value Ref Range    Sodium 141 136 - 145 mmol/L    Potassium 4.1 3.5 - 5.1 mmol/L    Chloride 109 95 - 110 mmol/L    CO2 26 23 - 29 mmol/L    Glucose 108 70 - 110 mg/dL    BUN, Bld 20 6 - 20 mg/dL    Creatinine 1.0 0.5 - 1.4 mg/dL    Calcium 8.7 8.7 - 10.5 mg/dL    Anion Gap 6 (L) 8 - 16 mmol/L    eGFR if African American >60.0 >60 mL/min/1.73 m^2    eGFR if non African American >60.0 >60 mL/min/1.73 m^2   Magnesium    Collection Time: 08/29/20  5:21 AM   Result Value Ref Range    Magnesium 2.0 1.6 - 2.6 mg/dL   Phosphorus    Collection Time: 08/29/20  5:21 AM   Result Value Ref Range    Phosphorus 4.9 (H) 2.7 - 4.5 mg/dL   CBC with Automated Differential    Collection Time: 08/29/20  5:21 AM   Result Value Ref Range    WBC 8.98 3.90 - 12.70 K/uL    RBC 4.66 4.60 - 6.20  M/uL    Hemoglobin 13.3 (L) 14.0 - 18.0 g/dL    Hematocrit 41.7 40.0 - 54.0 %    Mean Corpuscular Volume 90 82 - 98 fL    Mean Corpuscular Hemoglobin 28.5 27.0 - 31.0 pg    Mean Corpuscular Hemoglobin Conc 31.9 (L) 32.0 - 36.0 g/dL    RDW 13.9 11.5 - 14.5 %    Platelets 204 150 - 350 K/uL    MPV 10.9 9.2 - 12.9 fL    Immature Granulocytes 1.6 (H) 0.0 - 0.5 %    Gran # (ANC) 5.8 1.8 - 7.7 K/uL    Immature Grans (Abs) 0.14 (H) 0.00 - 0.04 K/uL    Lymph # 2.2 1.0 - 4.8 K/uL    Mono # 0.7 0.3 - 1.0 K/uL    Eos # 0.2 0.0 - 0.5 K/uL    Baso # 0.06 0.00 - 0.20 K/uL    nRBC 0 0 /100 WBC    Gran% 64.1 38.0 - 73.0 %    Lymph% 24.1 18.0 - 48.0 %    Mono% 7.5 4.0 - 15.0 %    Eosinophil% 2.0 0.0 - 8.0 %    Basophil% 0.7 0.0 - 1.9 %    Differential Method Automated    Troponin I    Collection Time: 08/29/20  5:21 AM   Result Value Ref Range    Troponin I <0.030 <=0.040 ng/mL   Vitamin B12    Collection Time: 08/29/20  5:21 AM   Result Value Ref Range    Vitamin B-12 485 210 - 950 pg/mL   Folate    Collection Time: 08/29/20  5:21 AM   Result Value Ref Range    Folate >24.8 (H) 4.0 - 24.0 ng/mL   Echo Color Flow Doppler? Yes    Collection Time: 08/29/20  9:05 AM   Result Value Ref Range    BSA 2.43 m2    TDI SEPTAL 0.07 m/s    LV LATERAL E/E' RATIO 5.23 m/s    LV SEPTAL E/E' RATIO 9.71 m/s    Right Atrial Pressure (from IVC) 3 mmHg    AORTIC VALVE CUSP SEPERATION 2.52 cm    TDI LATERAL 0.13 m/s    PV PEAK VELOCITY 112.44 cm/s    LVIDD 5.29 3.5 - 6.0 cm    IVS 1.05 0.6 - 1.1 cm    PW 1.06 0.6 - 1.1 cm    Ao root annulus 3.44 cm    LVIDS 3.99 2.1 - 4.0 cm    FS 25 28 - 44 %    LV mass 214.58 g    LA size 3.92 cm    RVDD 259.00 cm    Left Ventricle Relative Wall Thickness 0.40 cm    AV mean gradient 5 mmHg    AV valve area 4.46 cm2    AV Velocity Ratio 81.71     AV index (prosthetic) 0.95     E/A ratio 1.31     Mean e' 0.10 m/s    E wave decelartion time 174.04 msec    IVRT 74.59 msec    LVOT diameter 2.44 cm    LVOT area 4.7 cm2     LVOT peak aaron 119.29 m/s    LVOT peak VTI 26.84 cm    Ao peak aaron 1.46 m/s    Ao VTI 28.13 cm    LVOT stroke volume 125.44 cm3    AV peak gradient 9 mmHg    TV rest pulmonary artery pressure 26 mmHg    E/E' ratio 6.80 m/s    MV Peak E Aaron 0.68 m/s    TR Max Aaron 2.38 m/s    MV Peak A Aaron 0.52 m/s    LV Mass Index 91 g/m2    Triscuspid Valve Regurgitation Peak Gradient 23 mmHg   Nuclear Stress Test    Collection Time: 08/29/20 10:08 AM   Result Value Ref Range    Systolic blood pressure 168.0 mmHg    Diastolic blood pressure 92.0 mmHg    HR at rest 69.0 bpm    Exercise duration (min) 8 minutes    Exercise duration (sec) 36 seconds    Peak Systolic .0 mmHg    Peak Diatolic .0 mmHg    Peak .0 bpm    85% Max Predicted      % Max HR Achieved 94     1 Minute Recovery .0 bpm    RPP 11,592     Peak RPP 30,644     Max Predicted      [facility] CV CPX PATIENT IS MALE 1     [facility] CV CPX PATIENT IS FEMALE 0      Imaging Results          US Carotid Bilateral (Final result)  Result time 08/28/20 20:46:00    Final result by Tyrone Caballero MD (08/28/20 20:46:00)                 Narrative:    History: 45-year-old male with syncope x2 weeks.    PROCEDURE: Carotid arterial vascular evaluation August 28, 2020. Grayscale, color flow and Doppler images provided.    COMPARISON: None.    FINDINGS:  Minimal plaque noted in the bilateral carotid arteries. Doppler waveform analysis bilaterally is normal.    Peak systolic velocity right common carotid artery 98.6 cm/s, end-diastolic velocity 26.9 cm/s.  Peak systolic velocity right internal carotid artery 74.4 cm/s, end-diastolic velocity 29.1 cm/s.  Antegrade flow present right vertebral artery.  Right ICA to CCA ratio systolic 0.8, end-diastolic 1.1.        Peak systolic velocity left common carotid artery 105.8 cm/s, end-diastolic velocity 26.7 cm/s.  Peak systolic velocity left internal carotid artery 77.0 cm/s, end-diastolic velocity 35.2 cm/s.  Antegrade flow  present left vertebral artery.  Left ICA to CCA ratio systolic 0.7, end-diastolic 1.3.    IMPRESSION:  1. No significant stenotic lesion identified within the extracranial carotid arteries bilaterally.  2. Antegrade flow present bilateral vertebral arteries.    Electronically signed by:  Tyrone Caballero MD  8/28/2020 9:54 PM CDT Workstation: [id number]                             X-Ray Chest PA And Lateral (Final result)  Result time 08/28/20 17:34:53   Procedure changed from X-Ray Chest AP Portable     Final result by Bernardo Vazquez MD (08/28/20 17:34:53)                 Narrative:    2 view chest    CLINICAL DATA: Chest pain    FINDINGS: PA and lateral views are compared to March 2017.    Heart size is normal. The mediastinum is unremarkable. There are no  infiltrates or effusions. There is mild linear scarring in the right  middle lobe, unchanged. No acute osseous abnormality is demonstrated.    IMPRESSION:  1. No acute radiographic abnormalities.  2. Mild linear scarring in the right middle lobe.    Electronically Signed by Zay Vazquez M.D. on 8/28/2020 5:38 PM                            Physical Exam:   Physical Exam   Constitutional: He is oriented to person, place, and time and well-developed, well-nourished, and in no distress. No distress.   Neck: No JVD present.   Cardiovascular: Normal rate, regular rhythm and normal heart sounds.   No murmur heard.  Pulmonary/Chest: Effort normal and breath sounds normal. No respiratory distress. He has no wheezes. He has no rales.   Abdominal: Soft. Bowel sounds are normal. There is no abdominal tenderness.   Musculoskeletal: Normal range of motion.         General: No edema.   Neurological: He is alert and oriented to person, place, and time.   Skin: Skin is warm and dry. He is not diaphoretic.       ASSESSMENT/PLAN:   Assessment:   Non-specific CP  Syncope  HTN   +Utox for THC    Plan:   Awaiting official results of MPI imaging. Will have Dr. Neumann remotely  assess echocardiogram. If MPI is negative and patient continues to feel well, he can be cleared for discharge from a cardiac standpoint. Will try to obtain echocardiogram imaging prior to this, however we will not depend on echocardiogram results for discharge planning.  Start Ranexa 500 mg BID for possible antianginal coverage. Continue current cardiac medications otherwise.  Will plan for outpatient rhythm monitoring and tilt-table test to assess syncope further.    Thank you for your consult.    Brendan Velez PA-C  08/29/2020

## 2020-08-29 NOTE — FIRST PROVIDER EVALUATION
"Medical screening exam completed.  I have conducted a focused provider triage encounter, findings are as follows:    Brief history of present illness:  Chest pains off and on since jan. Has had multiple syncopal episodes. Told by Dr horowitz office to come and get admitted     Vitals:    08/28/20 1709   BP: 128/77   BP Location: Left arm   Patient Position: Sitting   Pulse: 84   Resp: 19   Temp: 98.5 °F (36.9 °C)   TempSrc: Oral   SpO2: 97%   Weight: 117.9 kg (260 lb)   Height: 5' 11" (1.803 m)       Pertinent physical exam: chest pain / syncope     Brief workup plan: labs, ekg, CXR     Preliminary workup initiated; this workup will be continued and followed by the physician or advanced practice provider that is assigned to the patient when roomed.  "

## 2020-08-29 NOTE — SUBJECTIVE & OBJECTIVE
Past Medical History:   Diagnosis Date    Diverticulitis     GERD (gastroesophageal reflux disease)     HTN (hypertension)     Kidney stone     Sleep apnea      Past Surgical History:   Procedure Laterality Date    CHOLECYSTECTOMY      FRACTURE SURGERY      GERD      rt foot surgery      VASECTOMY         Review of patient's allergies indicates:  No Known Allergies    No current facility-administered medications on file prior to encounter.      Current Outpatient Medications on File Prior to Encounter   Medication Sig    albuterol 90 mcg/actuation HFAA Inhale 2 puffs into the lungs every 6 (six) hours as needed.    aspirin 81 MG Chew Take 81 mg by mouth once daily.    celecoxib (CELEBREX) 200 MG capsule Take 200 mg by mouth once daily.    fexofenadine (ALLEGRA) 180 MG tablet Take 180 mg by mouth once daily.    lisinopriL (PRINIVIL,ZESTRIL) 20 MG tablet Take 20 mg by mouth once daily.    metoprolol tartrate (LOPRESSOR) 50 MG tablet Take 50 mg by mouth once daily.    multivitamin (MULTIVITAMIN) per tablet Take 1 tablet by mouth once daily.      pantoprazole (PROTONIX) 40 MG tablet Take 40 mg by mouth once daily.    amoxicillin-clavulanate 875-125mg (AUGMENTIN) 875-125 mg per tablet Take 1 tablet by mouth every 12 (twelve) hours.    fluticasone propionate (FLONASE) 50 mcg/actuation nasal spray 2 sprays (100 mcg total) by Each Nostril route once daily.    HYDROcodone-acetaminophen (NORCO) 5-325 mg per tablet Take 1 tablet by mouth every 8 (eight) hours as needed for Pain. (Patient not taking: Reported on 8/3/2020)    Lactobacillus rhamnosus GG (CULTURELLE) 10 billion cell capsule Take 1 capsule by mouth once daily.    naproxen (EC NAPROSYN) 500 MG EC tablet Take 1 tablet (500 mg total) by mouth 2 (two) times daily with meals. (Patient not taking: Reported on 8/3/2020)    nitroGLYCERIN (NITROSTAT) 0.4 MG SL tablet PLACE 1 TAB UNDER THE TONGUE EVERY 5 MINS FOR CHEST PAIN IF NO RELIEF AFTER 3 DOSES  DIAL 911     Family History     Family history is unknown by patient.        Tobacco Use    Smoking status: Never Smoker    Smokeless tobacco: Never Used   Substance and Sexual Activity    Alcohol use: Yes     Alcohol/week: 3.0 standard drinks     Types: 2 Cans of beer, 1 Standard drinks or equivalent per week     Comment: Socially    Drug use: No    Sexual activity: Not on file     Review of Systems     All systems reviewed and are negative except as noted per above.    Objective:     Vital Signs (Most Recent):  Temp: 98.4 °F (36.9 °C) (08/29/20 0000)  Pulse: 64 (08/29/20 0000)  Resp: 20 (08/29/20 0000)  BP: (!) 153/90 (08/29/20 0000)  SpO2: 100 % (08/29/20 0000) Vital Signs (24h Range):  Temp:  [98.4 °F (36.9 °C)-98.5 °F (36.9 °C)] 98.4 °F (36.9 °C)  Pulse:  [59-84] 64  Resp:  [19-37] 20  SpO2:  [95 %-100 %] 100 %  BP: (116-153)/(66-96) 153/90     Weight: 117.9 kg (260 lb)  Body mass index is 36.26 kg/m².    Physical Exam    Gen: alert, responsive  HEENT:  Eyes - no pallor  External ears with no lesions  Nares patent  Mouth - lips chapped  CV: RRR  Lungs: CTA B/L  Abd: +BS, soft, NT, ND  Ext: no atrophy or edema  Skin: warm, dry  Neuro: grossly intact  Psych: pleasant     Significant Labs:   CBC:   Recent Labs   Lab 08/28/20  1732   WBC 10.27   HGB 14.1   HCT 43.3        CMP:   Recent Labs   Lab 08/28/20  1732      K 4.2      CO2 23      BUN 19   CREATININE 1.2   CALCIUM 9.5   PROT 7.5   ALBUMIN 4.0   BILITOT 0.4   ALKPHOS 73   AST 17   ALT 23   ANIONGAP 11   EGFRNONAA >60.0     Cardiac Markers:   Recent Labs   Lab 08/28/20  1732   BNP 19     Troponin:   Recent Labs   Lab 08/28/20  1732 08/28/20  2349   TROPONINI <0.030 <0.030     TSH:   Recent Labs   Lab 08/28/20  1732   TSH 1.840       Significant Imaging:     Imaging Results          US Carotid Bilateral (Final result)  Result time 08/28/20 20:46:00    Final result by Tyrone Caballero MD (08/28/20 20:46:00)                 Narrative:     History: 45-year-old male with syncope x2 weeks.    PROCEDURE: Carotid arterial vascular evaluation August 28, 2020. Grayscale, color flow and Doppler images provided.    COMPARISON: None.    FINDINGS:  Minimal plaque noted in the bilateral carotid arteries. Doppler waveform analysis bilaterally is normal.    Peak systolic velocity right common carotid artery 98.6 cm/s, end-diastolic velocity 26.9 cm/s.  Peak systolic velocity right internal carotid artery 74.4 cm/s, end-diastolic velocity 29.1 cm/s.  Antegrade flow present right vertebral artery.  Right ICA to CCA ratio systolic 0.8, end-diastolic 1.1.        Peak systolic velocity left common carotid artery 105.8 cm/s, end-diastolic velocity 26.7 cm/s.  Peak systolic velocity left internal carotid artery 77.0 cm/s, end-diastolic velocity 35.2 cm/s.  Antegrade flow present left vertebral artery.  Left ICA to CCA ratio systolic 0.7, end-diastolic 1.3.    IMPRESSION:  1. No significant stenotic lesion identified within the extracranial carotid arteries bilaterally.  2. Antegrade flow present bilateral vertebral arteries.    Electronically signed by:  Tyrone Caballero MD  8/28/2020 9:54 PM CDT Workstation: [id number]                             X-Ray Chest PA And Lateral (Final result)  Result time 08/28/20 17:34:53   Procedure changed from X-Ray Chest AP Portable     Final result by Bernardo Vazquez MD (08/28/20 17:34:53)                 Narrative:    2 view chest    CLINICAL DATA: Chest pain    FINDINGS: PA and lateral views are compared to March 2017.    Heart size is normal. The mediastinum is unremarkable. There are no  infiltrates or effusions. There is mild linear scarring in the right  middle lobe, unchanged. No acute osseous abnormality is demonstrated.    IMPRESSION:  1. No acute radiographic abnormalities.  2. Mild linear scarring in the right middle lobe.    Electronically Signed by Zay Vazquez M.D. on 8/28/2020 5:38 PM

## 2020-08-30 VITALS
BODY MASS INDEX: 35.99 KG/M2 | SYSTOLIC BLOOD PRESSURE: 124 MMHG | WEIGHT: 257.06 LBS | OXYGEN SATURATION: 97 % | DIASTOLIC BLOOD PRESSURE: 71 MMHG | TEMPERATURE: 98 F | HEIGHT: 71 IN | RESPIRATION RATE: 16 BRPM | HEART RATE: 61 BPM

## 2020-08-30 PROBLEM — R07.89 ATYPICAL CHEST PAIN: Status: RESOLVED | Noted: 2020-08-29 | Resolved: 2020-08-30

## 2020-08-30 PROBLEM — R55 SYNCOPE: Status: RESOLVED | Noted: 2020-08-28 | Resolved: 2020-08-30

## 2020-08-30 LAB
ANION GAP SERPL CALC-SCNC: 11 MMOL/L (ref 8–16)
AORTIC ROOT ANNULUS: 3.44 CM
AORTIC VALVE CUSP SEPERATION: 2.52 CM
AV INDEX (PROSTH): 0.95
AV MEAN GRADIENT: 5 MMHG
AV PEAK GRADIENT: 9 MMHG
AV VALVE AREA: 4.46 CM2
AV VELOCITY RATIO: 81.71
BASOPHILS # BLD AUTO: 0.05 K/UL (ref 0–0.2)
BASOPHILS NFR BLD: 0.6 % (ref 0–1.9)
BSA FOR ECHO PROCEDURE: 2.43 M2
BUN SERPL-MCNC: 19 MG/DL (ref 6–20)
CALCIUM SERPL-MCNC: 9.2 MG/DL (ref 8.7–10.5)
CHLORIDE SERPL-SCNC: 102 MMOL/L (ref 95–110)
CO2 SERPL-SCNC: 25 MMOL/L (ref 23–29)
CREAT SERPL-MCNC: 1.1 MG/DL (ref 0.5–1.4)
CV ECHO LV RWT: 0.4 CM
CV STRESS BASE HR: 69 BPM
DIASTOLIC BLOOD PRESSURE: 92 MMHG
DIFFERENTIAL METHOD: ABNORMAL
DOP CALC AO PEAK VEL: 1.46 M/S
DOP CALC AO VTI: 28.13 CM
DOP CALC LVOT AREA: 4.7 CM2
DOP CALC LVOT DIAMETER: 2.44 CM
DOP CALC LVOT PEAK VEL: 119.29 M/S
DOP CALC LVOT STROKE VOLUME: 125.44 CM3
DOP CALCLVOT PEAK VEL VTI: 26.84 CM
E WAVE DECELERATION TIME: 174.04 MSEC
E/A RATIO: 1.31
E/E' RATIO: 6.8 M/S
ECHO LV POSTERIOR WALL: 1.06 CM (ref 0.6–1.1)
EOSINOPHIL # BLD AUTO: 0.2 K/UL (ref 0–0.5)
EOSINOPHIL NFR BLD: 2.5 % (ref 0–8)
ERYTHROCYTE [DISTWIDTH] IN BLOOD BY AUTOMATED COUNT: 13.8 % (ref 11.5–14.5)
EST. GFR  (AFRICAN AMERICAN): >60 ML/MIN/1.73 M^2
EST. GFR  (NON AFRICAN AMERICAN): >60 ML/MIN/1.73 M^2
FRACTIONAL SHORTENING: 25 % (ref 28–44)
GLUCOSE SERPL-MCNC: 103 MG/DL (ref 70–110)
HCT VFR BLD AUTO: 43.3 % (ref 40–54)
HGB BLD-MCNC: 13.7 G/DL (ref 14–18)
IMM GRANULOCYTES # BLD AUTO: 0.17 K/UL (ref 0–0.04)
IMM GRANULOCYTES NFR BLD AUTO: 1.9 % (ref 0–0.5)
INTERVENTRICULAR SEPTUM: 1.05 CM (ref 0.6–1.1)
IVRT: 74.59 MSEC
LEFT ATRIUM SIZE: 3.92 CM
LEFT INTERNAL DIMENSION IN SYSTOLE: 3.99 CM (ref 2.1–4)
LEFT VENTRICLE MASS INDEX: 91 G/M2
LEFT VENTRICULAR INTERNAL DIMENSION IN DIASTOLE: 5.29 CM (ref 3.5–6)
LEFT VENTRICULAR MASS: 214.58 G
LV LATERAL E/E' RATIO: 5.23 M/S
LV SEPTAL E/E' RATIO: 9.71 M/S
LYMPHOCYTES # BLD AUTO: 2 K/UL (ref 1–4.8)
LYMPHOCYTES NFR BLD: 22.3 % (ref 18–48)
MAGNESIUM SERPL-MCNC: 2 MG/DL (ref 1.6–2.6)
MCH RBC QN AUTO: 28.2 PG (ref 27–31)
MCHC RBC AUTO-ENTMCNC: 31.6 G/DL (ref 32–36)
MCV RBC AUTO: 89 FL (ref 82–98)
MONOCYTES # BLD AUTO: 0.6 K/UL (ref 0.3–1)
MONOCYTES NFR BLD: 7.2 % (ref 4–15)
MV PEAK A VEL: 0.52 M/S
MV PEAK E VEL: 0.68 M/S
NEUTROPHILS # BLD AUTO: 5.8 K/UL (ref 1.8–7.7)
NEUTROPHILS NFR BLD: 65.5 % (ref 38–73)
NRBC BLD-RTO: 0 /100 WBC
OHS CV CPX 1 MINUTE RECOVERY HEART RATE: 150 BPM
OHS CV CPX 85 PERCENT MAX PREDICTED HEART RATE MALE: 147
OHS CV CPX MAX PREDICTED HEART RATE: 173
OHS CV CPX PATIENT IS FEMALE: 0
OHS CV CPX PATIENT IS MALE: 1
OHS CV CPX PEAK DIASTOLIC BLOOD PRESSURE: 102 MMHG
OHS CV CPX PEAK HEAR RATE: 163 BPM
OHS CV CPX PEAK RATE PRESSURE PRODUCT: NORMAL
OHS CV CPX PEAK SYSTOLIC BLOOD PRESSURE: 188 MMHG
OHS CV CPX PERCENT MAX PREDICTED HEART RATE ACHIEVED: 94
OHS CV CPX RATE PRESSURE PRODUCT PRESENTING: NORMAL
PHOSPHATE SERPL-MCNC: 4 MG/DL (ref 2.7–4.5)
PISA TR MAX VEL: 2.38 M/S
PLATELET # BLD AUTO: 216 K/UL (ref 150–350)
PMV BLD AUTO: 11.2 FL (ref 9.2–12.9)
POTASSIUM SERPL-SCNC: 3.9 MMOL/L (ref 3.5–5.1)
PV PEAK VELOCITY: 112.44 CM/S
RA PRESSURE: 3 MMHG
RBC # BLD AUTO: 4.86 M/UL (ref 4.6–6.2)
RIGHT VENTRICULAR END-DIASTOLIC DIMENSION: 259 CM
SODIUM SERPL-SCNC: 138 MMOL/L (ref 136–145)
STRESS ECHO POST EXERCISE DUR MIN: 8 MINUTES
STRESS ECHO POST EXERCISE DUR SEC: 36 SECONDS
SYSTOLIC BLOOD PRESSURE: 168 MMHG
TDI LATERAL: 0.13 M/S
TDI SEPTAL: 0.07 M/S
TDI: 0.1 M/S
TR MAX PG: 23 MMHG
TV REST PULMONARY ARTERY PRESSURE: 26 MMHG
WBC # BLD AUTO: 8.8 K/UL (ref 3.9–12.7)

## 2020-08-30 PROCEDURE — 83735 ASSAY OF MAGNESIUM: CPT

## 2020-08-30 PROCEDURE — 36415 COLL VENOUS BLD VENIPUNCTURE: CPT

## 2020-08-30 PROCEDURE — 99900035 HC TECH TIME PER 15 MIN (STAT)

## 2020-08-30 PROCEDURE — 94761 N-INVAS EAR/PLS OXIMETRY MLT: CPT

## 2020-08-30 PROCEDURE — 80048 BASIC METABOLIC PNL TOTAL CA: CPT

## 2020-08-30 PROCEDURE — 84100 ASSAY OF PHOSPHORUS: CPT

## 2020-08-30 PROCEDURE — 85025 COMPLETE CBC W/AUTO DIFF WBC: CPT

## 2020-08-30 PROCEDURE — 25000003 PHARM REV CODE 250: Performed by: PHYSICIAN ASSISTANT

## 2020-08-30 PROCEDURE — 25000003 PHARM REV CODE 250: Performed by: FAMILY MEDICINE

## 2020-08-30 PROCEDURE — G0378 HOSPITAL OBSERVATION PER HR: HCPCS

## 2020-08-30 RX ORDER — METOPROLOL SUCCINATE 25 MG/1
25 TABLET, EXTENDED RELEASE ORAL DAILY
Qty: 30 TABLET | Refills: 11 | Status: CANCELLED | OUTPATIENT
Start: 2020-08-30 | End: 2021-08-30

## 2020-08-30 RX ORDER — LISINOPRIL 5 MG/1
5 TABLET ORAL DAILY
Qty: 90 TABLET | Refills: 3 | Status: CANCELLED | OUTPATIENT
Start: 2020-08-30 | End: 2021-08-30

## 2020-08-30 RX ORDER — RANOLAZINE 500 MG/1
500 TABLET, EXTENDED RELEASE ORAL 2 TIMES DAILY
Qty: 60 TABLET | Refills: 11 | Status: SHIPPED | OUTPATIENT
Start: 2020-08-30 | End: 2023-06-05

## 2020-08-30 RX ORDER — ATORVASTATIN CALCIUM 40 MG/1
40 TABLET, FILM COATED ORAL NIGHTLY
Qty: 90 TABLET | Refills: 3 | Status: SHIPPED | OUTPATIENT
Start: 2020-08-30 | End: 2023-11-27

## 2020-08-30 RX ORDER — METOPROLOL SUCCINATE 50 MG/1
50 TABLET, EXTENDED RELEASE ORAL DAILY
Qty: 30 TABLET | Refills: 11 | Status: SHIPPED | OUTPATIENT
Start: 2020-08-30 | End: 2023-06-05

## 2020-08-30 RX ADMIN — METOPROLOL TARTRATE 50 MG: 50 TABLET, FILM COATED ORAL at 09:08

## 2020-08-30 RX ADMIN — ASPIRIN 81 MG CHEWABLE TABLET 81 MG: 81 TABLET CHEWABLE at 09:08

## 2020-08-30 RX ADMIN — RANOLAZINE 500 MG: 500 TABLET, EXTENDED RELEASE ORAL at 09:08

## 2020-08-30 NOTE — CONSULTS
"[facility]  Neurology  Consult Note    Patient Name: Shady Snyder  MRN: [id number]  Admission Date: 8/28/2020  Hospital Length of Stay: 0 days  Code Status: Full Code   Attending Provider: Dr Ortega  Consulting Provider: Dr Hummel  Primary Care Physician: Jac Varma MD  Principal Problem:Atypical chest pain    Consults  Subjective:     Chief Complaint:    Chest Pain         HPI: 46 yo CM with PMF of HTN, RAZA presents with chest pain.  Occurring intermittently over the past month, with worsening frequency and severity  Now accompanied by syncopal events  CP described as L sided, squeezing pressure  Radiates to jaw and L arm  Then pt "blacks out" for a few seconds  Also experiences tongue biting and numbness around his mouth  +L arm numbness and weakness  No confusion before or after  No SOB, no palpitations, no edema  +intermittent slurred speech, no dysphagia  He has been reluctant to stop driving despite syncopal episodes  10 days ago was seen by [facility] with nl head CT  However his CP continued to reoccur with syncopal episodes  He spoke to his cardiologist who said to come here for admission     Pt also reports he's been seen recently by his PCP for:  Thyroid disorder  Strep throat and sinusitis  Low testosterone  Possible AV suresh blockage (per pt's report)  However he is on metoprolol  And he says he was given Celebrex for chest pain  Also with history of costochondritis  However he says this current squeezing pressure chest pain is new and different to previous pain    Neurological Consult:  Patient seen examined.  Plan care discussed with Dr. herrera patient reports he has been having a little episodes of syncope a few times a week.  He reports a couple months ago he started with a virus and he has had different antibiotics for the past 2-3 months for treatment of strep to sinuses to epididymitis.  Denies any history of seizures.  He says he knows when he is going to have a syncopal episode " because he feels dizzy and loses consciousness for few seconds.  His dizziness is worse with movement and is episodic.  Patient denies back or neck injury.  He has seen ENT.  He does report left neck tenderness due to sole glands in the past.       Past Medical History:   Diagnosis Date    Diverticulitis     GERD (gastroesophageal reflux disease)     HTN (hypertension)     Kidney stone     Sleep apnea        Past Surgical History:   Procedure Laterality Date    CHOLECYSTECTOMY      FRACTURE SURGERY      GERD      rt foot surgery      VASECTOMY         Review of patient's allergies indicates:  No Known Allergies    Current Neurological Medications:     No current facility-administered medications on file prior to encounter.      Current Outpatient Medications on File Prior to Encounter   Medication Sig    albuterol 90 mcg/actuation HFAA Inhale 2 puffs into the lungs every 6 (six) hours as needed.    aspirin 81 MG Chew Take 81 mg by mouth once daily.    celecoxib (CELEBREX) 200 MG capsule Take 200 mg by mouth once daily.    fexofenadine (ALLEGRA) 180 MG tablet Take 180 mg by mouth once daily.    lisinopriL (PRINIVIL,ZESTRIL) 20 MG tablet Take 20 mg by mouth once daily.    metoprolol tartrate (LOPRESSOR) 50 MG tablet Take 50 mg by mouth once daily.    multivitamin (MULTIVITAMIN) per tablet Take 1 tablet by mouth once daily.      pantoprazole (PROTONIX) 40 MG tablet Take 40 mg by mouth once daily.    amoxicillin-clavulanate 875-125mg (AUGMENTIN) 875-125 mg per tablet Take 1 tablet by mouth every 12 (twelve) hours.    fluticasone propionate (FLONASE) 50 mcg/actuation nasal spray 2 sprays (100 mcg total) by Each Nostril route once daily.    HYDROcodone-acetaminophen (NORCO) 5-325 mg per tablet Take 1 tablet by mouth every 8 (eight) hours as needed for Pain. (Patient not taking: Reported on 8/3/2020)    Lactobacillus rhamnosus GG (CULTURELLE) 10 billion cell capsule Take 1 capsule by mouth once daily.     naproxen (EC NAPROSYN) 500 MG EC tablet Take 1 tablet (500 mg total) by mouth 2 (two) times daily with meals. (Patient not taking: Reported on 8/3/2020)    nitroGLYCERIN (NITROSTAT) 0.4 MG SL tablet PLACE 1 TAB UNDER THE TONGUE EVERY 5 MINS FOR CHEST PAIN IF NO RELIEF AFTER 3 DOSES DIAL 911      Family History     Family history is unknown by patient.        Tobacco Use    Smoking status: Never Smoker    Smokeless tobacco: Never Used   Substance and Sexual Activity    Alcohol use: Yes     Alcohol/week: 3.0 standard drinks     Types: 2 Cans of beer, 1 Standard drinks or equivalent per week     Comment: Socially    Drug use: No    Sexual activity: Not on file     Review of Systems   Constitutional: Negative.    HENT: Negative.    Eyes: Negative.    Respiratory: Negative.    Cardiovascular: Negative.    Gastrointestinal: Negative.    Endocrine: Negative.    Genitourinary: Negative.    Musculoskeletal: Positive for neck pain.   Skin: Negative.    Allergic/Immunologic: Negative.    Neurological: Positive for dizziness, seizures and syncope.   Hematological: Negative.    Psychiatric/Behavioral: The patient is nervous/anxious.      Objective:     Vital Signs (Most Recent):  Temp: 98.3 °F (36.8 °C) (08/29/20 1859)  Pulse: 64 (08/29/20 1859)  Resp: 18 (08/29/20 1859)  BP: 116/73 (08/29/20 1859)  SpO2: 96 % (08/29/20 1859) Vital Signs (24h Range):  Temp:  [97.7 °F (36.5 °C)-98.4 °F (36.9 °C)] 98.3 °F (36.8 °C)  Pulse:  [59-90] 64  Resp:  [14-37] 18  SpO2:  [95 %-100 %] 96 %  BP: (106-158)/(60-96) 116/73     Weight: 117.9 kg (260 lb)  Body mass index is 36.26 kg/m².    Physical Exam  HENT:      Head: Normocephalic.      Nose: Nose normal.      Mouth/Throat:      Mouth: Mucous membranes are moist.   Eyes:      Extraocular Movements: Extraocular movements intact.      Pupils: Pupils are equal, round, and reactive to light.   Neck:      Musculoskeletal: Normal range of motion.   Cardiovascular:      Rate and Rhythm:  Normal rate.   Pulmonary:      Effort: Pulmonary effort is normal.   Abdominal:      Palpations: Abdomen is soft.   Musculoskeletal: Normal range of motion.   Skin:     General: Skin is warm.      Capillary Refill: Capillary refill takes less than 2 seconds.   Neurological:      General: No focal deficit present.      Mental Status: He is alert.      GCS: GCS eye subscore is 4. GCS verbal subscore is 5. GCS motor subscore is 6.   Psychiatric:         Mood and Affect: Mood is anxious.         Speech: Speech normal.         Behavior: Behavior is cooperative.         Cognition and Memory: Cognition and memory normal.         NEUROLOGICAL EXAMINATION:     MENTAL STATUS   Speech: speech is normal     CRANIAL NERVES     CN III, IV, VI   Pupils are equal, round, and reactive to light.      Significant Labs:   Lab Results   Component Value Date    WBC 8.98 08/29/2020    HGB 13.3 (L) 08/29/2020    HCT 41.7 08/29/2020    MCV 90 08/29/2020     08/29/2020       CMP  Sodium   Date Value Ref Range Status   08/29/2020 141 136 - 145 mmol/L Final     Potassium   Date Value Ref Range Status   08/29/2020 4.1 3.5 - 5.1 mmol/L Final     Chloride   Date Value Ref Range Status   08/29/2020 109 95 - 110 mmol/L Final     CO2   Date Value Ref Range Status   08/29/2020 26 23 - 29 mmol/L Final     Glucose   Date Value Ref Range Status   08/29/2020 108 70 - 110 mg/dL Final     BUN, Bld   Date Value Ref Range Status   08/29/2020 20 6 - 20 mg/dL Final     Creatinine   Date Value Ref Range Status   08/29/2020 1.0 0.5 - 1.4 mg/dL Final   04/04/2012 1.0 0.2 - 1.4 mg/dl Final     Calcium   Date Value Ref Range Status   08/29/2020 8.7 8.7 - 10.5 mg/dL Final   04/04/2012 9.5 8.6 - 10.2 mg/dl Final     Total Protein   Date Value Ref Range Status   08/28/2020 7.5 6.0 - 8.4 g/dL Final     Albumin   Date Value Ref Range Status   08/28/2020 4.0 3.5 - 5.2 g/dL Final     Total Bilirubin   Date Value Ref Range Status   08/28/2020 0.4 0.1 - 1.0 mg/dL  Final     Comment:     For infants and newborns, interpretation of results should be based  on gestational age, weight and in agreement with clinical  observations.  Premature Infant recommended reference ranges:  Up to 24 hours.............<8.0 mg/dL  Up to 48 hours............<12.0 mg/dL  3-5 days..................<15.0 mg/dL  6-29 days.................<15.0 mg/dL       Alkaline Phosphatase   Date Value Ref Range Status   08/28/2020 73 55 - 135 U/L Final   04/04/2012 79 23 - 119 UNIT/L Final     AST   Date Value Ref Range Status   08/28/2020 17 10 - 40 U/L Final   04/04/2012 15 10 - 34 UNIT/L Final     ALT   Date Value Ref Range Status   08/28/2020 23 10 - 44 U/L Final     Anion Gap   Date Value Ref Range Status   08/29/2020 6 (L) 8 - 16 mmol/L Final   04/04/2012 10 5 - 15 meq/L Final     eGFR if    Date Value Ref Range Status   08/29/2020 >60.0 >60 mL/min/1.73 m^2 Final     eGFR if non    Date Value Ref Range Status   08/29/2020 >60.0 >60 mL/min/1.73 m^2 Final     Comment:     Calculation used to obtain the estimated glomerular filtration  rate (eGFR) is the CKD-EPI equation.        Lab Results   Component Value Date    CHOL 203 (H) 08/28/2020     Lab Results   Component Value Date    HDL 35 (L) 08/28/2020     Lab Results   Component Value Date    LDLCALC 119.8 08/28/2020     Lab Results   Component Value Date    TRIG 241 (H) 08/28/2020     Lab Results   Component Value Date    CHOLHDL 17.2 (L) 08/28/2020         Significant Imaging: MRI Brain Without Contrast  MRI brain    CLINICAL DATA: Syncope    FINDINGS: Multiplanar noncontrast imaging was performed. Comparison is  made to a CT head from August 17, 2020.    There is no intracranial mass, hemorrhage, or midline shift.  Ventricles and sulci are normal. There are no pathologic extra-axial  fluid collections.    There is no evidence of ischemic change, edema, or demyelinating  disease. Cerebellum and brainstem are unremarkable.  The orbits,  paranasal sinuses, skull base, and sella turcica are within normal  limits.    IMPRESSION:  1. Normal MRI brain without contrast.    Electronically Signed by Zay Vazquez M.D. on 8/29/2020 8:22 AM  MRA Brain without contrast  MRA brain    CLINICAL DATA: Syncope    FINDINGS: 3-D time-of-flight intracranial MRA was performed.    There is appropriate flow sensitive signal within the intracranial  portions of the internal carotid arteries. Both vertebral arteries are  patent, with dominant left vertebral artery noted. The basilar artery  is normal in appearance.    The the bilateral anterior, middle, and posterior cerebral arteries  are normal in appearance, without stenosis, major branch occlusion, or  aneurysm. Patent bilateral posterior communicating arteries are noted.  Patent anterior communicating artery is also demonstrated.    IMPRESSION:  1. Normal intracranial MRA.    Electronically Signed by Zay Vazquez M.D. on 8/29/2020 8:19 AM          Assessment and Plan:    Transient Syncope/Numbness  -Rule out seizures/CVA  -MRI brain: negative  -MRA brain: negative  -CUS: negative  -ECHO:  Pending  -EEG: normal    -Folate: > 24.8  -B12:  485  -Lipids: 203/ 119.8   -TSH: 1.840    PLAN: will follow up on ECHO.      Active Diagnoses:    Diagnosis Date Noted POA    PRINCIPAL PROBLEM:  Atypical chest pain [R07.89] 08/29/2020 Yes    Essential hypertension [I10] 08/29/2020 Yes    Syncope [R55] 08/28/2020 Yes      Problems Resolved During this Admission:       VTE Risk Mitigation (From admission, onward)         Ordered     enoxaparin injection 40 mg  Every 24 hours      08/28/20 2044     IP VTE HIGH RISK PATIENT  Once      08/28/20 2044     Place sequential compression device  Until discontinued      08/28/20 2044                Thank you for your consult. I will follow-up with patient. Please contact us if you have any additional questions.    Sandy Nieto NP  Neurology  [facility]  [facility]

## 2020-08-30 NOTE — PLAN OF CARE
08/30/20 0950   Patient Assessment/Suction   Level of Consciousness (AVPU) alert   All Lung Fields Breath Sounds clear   PRE-TX-O2   O2 Device (Oxygen Therapy) room air   SpO2 97 %   Pulse Oximetry Type Intermittent   $ Pulse Oximetry - Multiple Charge Pulse Oximetry - Multiple   Pulse 75   Resp 15   Aerosol Therapy   $ Aerosol Therapy Charges PRN treatment not required   Respiratory Evaluation   $ Care Plan Tech Time 15 min

## 2020-08-30 NOTE — HOSPITAL COURSE
45-year-old  male with known essential hypertension was directed to the ED by his cardiologist secondary to recurrent intermittent left-sided chest pain as well as syncope.  Onset approximately 3-4 weeks ago.  States that his pain is reproducible with palpation and associated with syncopal episodes.    Troponins x2 negative.  MRI brain, MRI head and EEG - normal studies.  Chest x-ray unremarkable.  Carotid ultrasound negative for hemodynamically significant stenosis.  Stress test with myocardial perfusion imaging negative for reversible ischemia.  Of note UDS positive for marijuana metabolites.  Patient had improvement in symptoms and was deemed medically stable to be discharged home.  He was initiated on ranolazine 500 mg b.i.d. by Cardiology.  In addition I started him on atorvastatin 40 mg q.h.s for hyperlipidemia.  Advised to follow up with Cardiology outpatient for Holter monitoring as well as tilt-table test for syncope workup.  He voiced understanding of the plan.    Instructions provided to follow up with primary care physician as outpatient. Patient verbalized understanding and is aware to contact primary care physician or return to ED if new or worsening symptoms.    Physical exam on the day of discharge:  General: Patient resting comfortably in no acute distress.  Lungs: CTA. Good air entry.  Cor: Regular rate and rhythm. No murmurs. No pedal edema.  Abd: Soft. Nontender. Non-distended.  Neuro: A&O x3. Moving all 4 extremities equally  Ext: No clubbing. No cyanosis.

## 2020-08-30 NOTE — DISCHARGE SUMMARY
"[facility]  Discharge Summary      Patient Name: Shady Snyder  MRN: [id number]  Admission Date: 8/28/2020  Hospital Length of Stay: 0 days  Discharge Date and Time: 8/30/2020 12:50 PM  Attending Physician: No att. providers found   Discharging Provider: Curtis Ortega MD  Primary Care Provider: Jac Varma MD      HPI:   46 yo CM with PMF of HTN, RAZA presents with chest pain.  Occurring intermittently over the past month, with worsening frequency and severity  Now accompanied by syncopal events  CP described as L sided, squeezing pressure  Radiates to jaw and L arm  Then pt "blacks out" for a few seconds  Also experiences tongue biting and numbness around his mouth  +L arm numbness and weakness  No confusion before or after  No SOB, no palpitations, no edema  +intermittent slurred speech, no dysphagia  He has been reluctant to stop driving despite syncopal episodes  10 days ago was seen by [facility] ED with nl head CT  However his CP continued to reoccur with syncopal episodes  He spoke to his cardiologist who said to come here for admission    Pt also reports he's been seen recently by his PCP for:  Thyroid disorder  Strep throat and sinusitis  Low testosterone  Possible AV suresh blockage (per pt's report)  However he is on metoprolol  And he says he was given Celebrex for chest pain  Also with history of costochondritis  However he says this current squeezing pressure chest pain is new and different to previous pain    * No surgery found *      Hospital Course:   45-year-old  male with known essential hypertension was directed to the ED by his cardiologist secondary to recurrent intermittent left-sided chest pain as well as syncope.  Onset approximately 3-4 weeks ago.  States that his pain is reproducible with palpation and associated with syncopal episodes.    Troponins x2 negative.  MRI brain, MRI head and EEG - normal studies.  Chest x-ray unremarkable.  Carotid " ultrasound negative for hemodynamically significant stenosis.  Stress test with myocardial perfusion imaging negative for reversible ischemia.  Of note UDS positive for marijuana metabolites.  Patient had improvement in symptoms and was deemed medically stable to be discharged home.  He was initiated on ranolazine 500 mg b.i.d. by Cardiology.  In addition I started him on atorvastatin 40 mg q.h.s for hyperlipidemia.  Advised to follow up with Cardiology outpatient for Holter monitoring as well as tilt-table test for syncope workup.  He voiced understanding of the plan.    Instructions provided to follow up with primary care physician as outpatient. Patient verbalized understanding and is aware to contact primary care physician or return to ED if new or worsening symptoms.    Physical exam on the day of discharge:  General: Patient resting comfortably in no acute distress.  Lungs: CTA. Good air entry.  Cor: Regular rate and rhythm. No murmurs. No pedal edema.  Abd: Soft. Nontender. Non-distended.  Neuro: A&O x3. Moving all 4 extremities equally  Ext: No clubbing. No cyanosis.        Consults:   Consults (From admission, onward)        Status Ordering Provider     Inpatient consult to Cardiology  Once     Provider:  Cruz Herrera MD    Completed YAS HICKS          No new Assessment & Plan notes have been filed under this hospital service since the last note was generated.  Service: Hospital Medicine    Final Active Diagnoses:    Diagnosis Date Noted POA    Essential hypertension [I10] 08/29/2020 Yes      Problems Resolved During this Admission:    Diagnosis Date Noted Date Resolved POA    PRINCIPAL PROBLEM:  Atypical chest pain [R07.89] 08/29/2020 08/30/2020 Yes    Syncope [R55] 08/28/2020 08/30/2020 Yes       Discharged Condition: good    Disposition: Home or Self Care    Follow Up:  Follow-up Information     Jac Varma MD In 2 weeks.    Specialty: Family Medicine  Why: As needed  Contact  information:  [address] [place] [address]  [phone]             Brendan Velez PA-C. Schedule an appointment as soon as possible for a visit in 1 week.    Specialty: Cardiology  Why: Cardiology - Discuss about Holter monitor and tilt table testing   Contact information:  [address]  [address]  [place]  [place] [address]  [phone]                 Patient Instructions:      Diet Cardiac     Notify your health care provider if you experience any of the following:  temperature >100.4     Notify your health care provider if you experience any of the following:  persistent nausea and vomiting or diarrhea     Notify your health care provider if you experience any of the following:  persistent dizziness, light-headedness, or visual disturbances     Notify your health care provider if you experience any of the following:  severe uncontrolled pain     Notify your health care provider if you experience any of the following:   Order Comments: Recurrent or worsening symptoms     Activity as tolerated       Significant Diagnostic Studies: Labs:   CMP   Recent Labs   Lab 08/29/20  0521 08/30/20  0427    138   K 4.1 3.9    102   CO2 26 25    103   BUN 20 19   CREATININE 1.0 1.1   CALCIUM 8.7 9.2   ANIONGAP 6* 11   ESTGFRAFRICA >60.0 >60.0   EGFRNONAA >60.0 >60.0   , CBC   Recent Labs   Lab 08/29/20  0521 08/30/20  0427   WBC 8.98 8.80   HGB 13.3* 13.7*   HCT 41.7 43.3    216   , Lipid Panel   Lab Results   Component Value Date    CHOL 203 (H) 08/28/2020    HDL 35 (L) 08/28/2020    LDLCALC 119.8 08/28/2020    TRIG 241 (H) 08/28/2020    CHOLHDL 17.2 (L) 08/28/2020   , Troponin   Recent Labs   Lab 08/29/20  0521   TROPONINI <0.030    and A1C:   Recent Labs   Lab 08/03/20  1402 08/28/20  2349   HGBA1C 5.7* 5.9     Cardiac Graphics: Echocardiogram:   Transthoracic echo (TTE) complete (Cupid Only):   Results for orders placed or performed during the hospital encounter of  08/28/20   Echo Color Flow Doppler? Yes   Result Value Ref Range    BSA 2.43 m2    TDI SEPTAL 0.07 m/s    LV LATERAL E/E' RATIO 5.23 m/s    LV SEPTAL E/E' RATIO 9.71 m/s    Right Atrial Pressure (from IVC) 3 mmHg    AORTIC VALVE CUSP SEPERATION 2.52 cm    TDI LATERAL 0.13 m/s    PV PEAK VELOCITY 112.44 cm/s    LVIDD 5.29 3.5 - 6.0 cm    IVS 1.05 0.6 - 1.1 cm    PW 1.06 0.6 - 1.1 cm    Ao root annulus 3.44 cm    LVIDS 3.99 2.1 - 4.0 cm    FS 25 28 - 44 %    LV mass 214.58 g    LA size 3.92 cm    RVDD 259.00 cm    Left Ventricle Relative Wall Thickness 0.40 cm    AV mean gradient 5 mmHg    AV valve area 4.46 cm2    AV Velocity Ratio 81.71     AV index (prosthetic) 0.95     E/A ratio 1.31     Mean e' 0.10 m/s    E wave decelartion time 174.04 msec    IVRT 74.59 msec    LVOT diameter 2.44 cm    LVOT area 4.7 cm2    LVOT peak aaron 119.29 m/s    LVOT peak VTI 26.84 cm    Ao peak aaron 1.46 m/s    Ao VTI 28.13 cm    LVOT stroke volume 125.44 cm3    AV peak gradient 9 mmHg    TV rest pulmonary artery pressure 26 mmHg    E/E' ratio 6.80 m/s    MV Peak E Aaron 0.68 m/s    TR Max Aaron 2.38 m/s    MV Peak A Aaron 0.52 m/s    LV Mass Index 91 g/m2    Triscuspid Valve Regurgitation Peak Gradient 23 mmHg    Narrative    · The estimated PA systolic pressure is 26 mmHg.  · Normal left ventricular systolic function. The estimated ejection   fraction is 55%.  · Normal LV diastolic function.  · Mild aortic regurgitation.  · Mild tricuspid regurgitation.  · Normal central venous pressure (3 mmHg).             Radiology    2020  Date Procedure Name Status Accession Number Location   08/30/20 10:20 AM NM Myocardial Perfusion Spect Multi Exer Final [id number] [facility]   08/29/20 08:09 AM MRI Brain Without Contrast Final [id number] [facility]   08/29/20 08:07 AM MRA Brain without contrast Final [id number] [facility]   08/28/20 09:23 PM US Carotid Bilateral Final [id number] [facility]   08/28/20 05:29 PM X-Ray Chest PA And Lateral Final [id number] [facility]   08/17/20 02:13 PM CT Head  Without Contrast Final [id number] [facility]   08/29/20 10:08 AM Nuclear Stress Test Final [id number] [facility]   08/29/20 09:05 AM Echo Color Flow Doppler? Yes Final [id number] [facility]           Pending Diagnostic Studies:     None         Medications:  Reconciled Home Medications:      Medication List      START taking these medications    atorvastatin 40 MG tablet  Commonly known as: LIPITOR  Take 1 tablet (40 mg total) by mouth every evening.     metoprolol succinate 50 MG 24 hr tablet  Commonly known as: TOPROL-XL  Take 1 tablet (50 mg total) by mouth once daily.     ranolazine 500 MG Tb12  Commonly known as: RANEXA  Take 1 tablet (500 mg total) by mouth 2 (two) times daily.        CONTINUE taking these medications    albuterol 90 mcg/actuation inhaler  Commonly known as: PROVENTIL/VENTOLIN HFA  Inhale 2 puffs into the lungs every 6 (six) hours as needed.     aspirin 81 MG Chew  Take 81 mg by mouth once daily.     fexofenadine 180 MG tablet  Commonly known as: ALLEGRA  Take 180 mg by mouth once daily.     fluticasone propionate 50 mcg/actuation nasal spray  Commonly known as: FLONASE  2 sprays (100 mcg total) by Each Nostril route once daily.     lisinopriL 20 MG tablet  Commonly known as: PRINIVIL,ZESTRIL  Take 20 mg by mouth once daily.     nitroGLYCERIN 0.4 MG SL tablet  Commonly known as: NITROSTAT  PLACE 1 TAB UNDER THE TONGUE EVERY 5 MINS FOR CHEST PAIN IF NO RELIEF AFTER 3 DOSES DIAL 911     ONE DAILY MULTIVITAMIN per tablet  Generic drug: multivitamin  Take 1 tablet by mouth once daily.     pantoprazole 40 MG tablet  Commonly known as: PROTONIX  Take 40 mg by mouth once daily.        STOP taking these medications    amoxicillin-clavulanate 875-125mg 875-125 mg per tablet  Commonly known as: AUGMENTIN     celecoxib 200 MG capsule  Commonly known as: CeleBREX     HYDROcodone-acetaminophen 5-325 mg per tablet  Commonly known as: NORCO     Lactobacillus rhamnosus GG 10 billion cell capsule  Commonly known as: CULTURELLE      metoprolol tartrate 50 MG tablet  Commonly known as: LOPRESSOR     naproxen 500 MG EC tablet  Commonly known as: EC NAPROSYN            Indwelling Lines/Drains at time of discharge:   Lines/Drains/Airways     None                 Time spent on the discharge of patient: 32 minutes  Patient was seen and examined on the date of discharge and determined to be suitable for discharge.         Curtis Ortega MD  Department of Hospital Medicine  [facility]

## 2020-08-30 NOTE — PLAN OF CARE
08/30/20 1236   Final Note   Assessment Type Final Discharge Note   Anticipated Discharge Disposition Home   Pt to be d/c home with no needs identified

## 2020-08-30 NOTE — PROGRESS NOTES
[facility]   Cardiology Note      SUBJECTIVE:     History of Present Illness: Seen and examined this AM. Denies any further cardiac complaints. States chest discomfort is improved this AM. Two day MPI to be completed today. ECHO as read by Dr. Neumann was unremarkable with normal LVEF and no significant valvular disease. EEG unremarkable. VSS.     Review of patient's allergies indicates:  No Known Allergies    Past Medical History:   Diagnosis Date    Diverticulitis     GERD (gastroesophageal reflux disease)     HTN (hypertension)     Kidney stone     Sleep apnea      Past Surgical History:   Procedure Laterality Date    CHOLECYSTECTOMY      FRACTURE SURGERY      GERD      rt foot surgery      VASECTOMY       Family History   Family history unknown: Yes     Social History     Tobacco Use    Smoking status: Never Smoker    Smokeless tobacco: Never Used   Substance Use Topics    Alcohol use: Yes     Alcohol/week: 3.0 standard drinks     Types: 2 Cans of beer, 1 Standard drinks or equivalent per week     Comment: Socially    Drug use: No       OBJECTIVE:     Vital Signs (Most Recent)  Temp: 97.8 °F (36.6 °C) (08/30/20 0711)  Pulse: 72 (08/30/20 0711)  Resp: 16 (08/30/20 0711)  BP: 126/64 (08/30/20 0711)  SpO2: 95 % (08/30/20 0711)    Vital Signs Range (Last 24H):  Temp:  [97.4 °F (36.3 °C)-98.3 °F (36.8 °C)]   Pulse:  [62-90]   Resp:  [16-18]   BP: (106-130)/(58-88)   SpO2:  [95 %-99 %]     I & O (Last 24H):    Intake/Output Summary (Last 24 hours) at 8/30/2020 0809  Last data filed at 8/30/2020 0454  Gross per 24 hour   Intake 480 ml   Output 600 ml   Net -120 ml       Current Diet:     Current Diet Order   Procedures    Diet Cardiac        Allergies:  Review of patient's allergies indicates:  No Known Allergies    Meds:  Scheduled Meds:   aspirin  81 mg Oral Daily    atorvastatin  40 mg Oral QHS    enoxaparin  40 mg Subcutaneous Q24H    fluticasone propionate  2 spray Each Nostril  Daily    metoprolol tartrate  50 mg Oral Daily    ranolazine  500 mg Oral BID     Continuous Infusions:  PRN Meds:acetaminophen, albuterol-ipratropium, dextrose 50%, dextrose 50%, glucagon (human recombinant), glucose, glucose, hydrALAZINE, hydrALAZINE, magnesium oxide, magnesium sulfate IVPB, magnesium sulfate IVPB, magnesium sulfate IVPB, magnesium sulfate IVPB, melatonin, morphine, ondansetron, polyethylene glycol, potassium chloride in water, potassium chloride in water, potassium chloride in water, potassium chloride in water, potassium chloride, potassium chloride, potassium chloride, potassium chloride, prochlorperazine, sodium chloride 0.9%         Laboratory and Radiology Data:  Recent Results (from the past 24 hour(s))   Echo Color Flow Doppler? Yes    Collection Time: 08/29/20  9:05 AM   Result Value Ref Range    BSA 2.43 m2    TDI SEPTAL 0.07 m/s    LV LATERAL E/E' RATIO 5.23 m/s    LV SEPTAL E/E' RATIO 9.71 m/s    Right Atrial Pressure (from IVC) 3 mmHg    AORTIC VALVE CUSP SEPERATION 2.52 cm    TDI LATERAL 0.13 m/s    PV PEAK VELOCITY 112.44 cm/s    LVIDD 5.29 3.5 - 6.0 cm    IVS 1.05 0.6 - 1.1 cm    PW 1.06 0.6 - 1.1 cm    Ao root annulus 3.44 cm    LVIDS 3.99 2.1 - 4.0 cm    FS 25 28 - 44 %    LV mass 214.58 g    LA size 3.92 cm    RVDD 259.00 cm    Left Ventricle Relative Wall Thickness 0.40 cm    AV mean gradient 5 mmHg    AV valve area 4.46 cm2    AV Velocity Ratio 81.71     AV index (prosthetic) 0.95     E/A ratio 1.31     Mean e' 0.10 m/s    E wave decelartion time 174.04 msec    IVRT 74.59 msec    LVOT diameter 2.44 cm    LVOT area 4.7 cm2    LVOT peak aaron 119.29 m/s    LVOT peak VTI 26.84 cm    Ao peak aaron 1.46 m/s    Ao VTI 28.13 cm    LVOT stroke volume 125.44 cm3    AV peak gradient 9 mmHg    TV rest pulmonary artery pressure 26 mmHg    E/E' ratio 6.80 m/s    MV Peak E Aaron 0.68 m/s    TR Max Aaron 2.38 m/s    MV Peak A Aaron 0.52 m/s    LV Mass Index 91 g/m2    Triscuspid Valve Regurgitation  Peak Gradient 23 mmHg   Nuclear Stress Test    Collection Time: 08/29/20 10:08 AM   Result Value Ref Range    Systolic blood pressure 168.0 mmHg    Diastolic blood pressure 92.0 mmHg    HR at rest 69.0 bpm    Exercise duration (min) 8 minutes    Exercise duration (sec) 36 seconds    Peak Systolic .0 mmHg    Peak Diatolic .0 mmHg    Peak .0 bpm    85% Max Predicted      % Max HR Achieved 94     1 Minute Recovery .0 bpm    RPP 11,592     Peak RPP 30,644     Max Predicted      [facility] CV CPX PATIENT IS MALE 1     [facility] CV CPX PATIENT IS FEMALE 0    Basic Metabolic Panel (BMP)    Collection Time: 08/30/20  4:27 AM   Result Value Ref Range    Sodium 138 136 - 145 mmol/L    Potassium 3.9 3.5 - 5.1 mmol/L    Chloride 102 95 - 110 mmol/L    CO2 25 23 - 29 mmol/L    Glucose 103 70 - 110 mg/dL    BUN, Bld 19 6 - 20 mg/dL    Creatinine 1.1 0.5 - 1.4 mg/dL    Calcium 9.2 8.7 - 10.5 mg/dL    Anion Gap 11 8 - 16 mmol/L    eGFR if African American >60.0 >60 mL/min/1.73 m^2    eGFR if non African American >60.0 >60 mL/min/1.73 m^2   Magnesium    Collection Time: 08/30/20  4:27 AM   Result Value Ref Range    Magnesium 2.0 1.6 - 2.6 mg/dL   Phosphorus    Collection Time: 08/30/20  4:27 AM   Result Value Ref Range    Phosphorus 4.0 2.7 - 4.5 mg/dL   CBC with Automated Differential    Collection Time: 08/30/20  4:27 AM   Result Value Ref Range    WBC 8.80 3.90 - 12.70 K/uL    RBC 4.86 4.60 - 6.20 M/uL    Hemoglobin 13.7 (L) 14.0 - 18.0 g/dL    Hematocrit 43.3 40.0 - 54.0 %    Mean Corpuscular Volume 89 82 - 98 fL    Mean Corpuscular Hemoglobin 28.2 27.0 - 31.0 pg    Mean Corpuscular Hemoglobin Conc 31.6 (L) 32.0 - 36.0 g/dL    RDW 13.8 11.5 - 14.5 %    Platelets 216 150 - 350 K/uL    MPV 11.2 9.2 - 12.9 fL    Immature Granulocytes 1.9 (H) 0.0 - 0.5 %    Gran # (ANC) 5.8 1.8 - 7.7 K/uL    Immature Grans (Abs) 0.17 (H) 0.00 - 0.04 K/uL    Lymph # 2.0 1.0 - 4.8 K/uL    Mono # 0.6 0.3 - 1.0 K/uL    Eos # 0.2  0.0 - 0.5 K/uL    Baso # 0.05 0.00 - 0.20 K/uL    nRBC 0 0 /100 WBC    Gran% 65.5 38.0 - 73.0 %    Lymph% 22.3 18.0 - 48.0 %    Mono% 7.2 4.0 - 15.0 %    Eosinophil% 2.5 0.0 - 8.0 %    Basophil% 0.6 0.0 - 1.9 %    Differential Method Automated      Imaging Results          US Carotid Bilateral (Final result)  Result time 08/28/20 20:46:00    Final result by Tyrone Caballero MD (08/28/20 20:46:00)                 Narrative:    History: 45-year-old male with syncope x2 weeks.    PROCEDURE: Carotid arterial vascular evaluation August 28, 2020. Grayscale, color flow and Doppler images provided.    COMPARISON: None.    FINDINGS:  Minimal plaque noted in the bilateral carotid arteries. Doppler waveform analysis bilaterally is normal.    Peak systolic velocity right common carotid artery 98.6 cm/s, end-diastolic velocity 26.9 cm/s.  Peak systolic velocity right internal carotid artery 74.4 cm/s, end-diastolic velocity 29.1 cm/s.  Antegrade flow present right vertebral artery.  Right ICA to CCA ratio systolic 0.8, end-diastolic 1.1.        Peak systolic velocity left common carotid artery 105.8 cm/s, end-diastolic velocity 26.7 cm/s.  Peak systolic velocity left internal carotid artery 77.0 cm/s, end-diastolic velocity 35.2 cm/s.  Antegrade flow present left vertebral artery.  Left ICA to CCA ratio systolic 0.7, end-diastolic 1.3.    IMPRESSION:  1. No significant stenotic lesion identified within the extracranial carotid arteries bilaterally.  2. Antegrade flow present bilateral vertebral arteries.    Electronically signed by:  Tyrone Caballero MD  8/28/2020 9:54 PM CDT Workstation: [id number]                             X-Ray Chest PA And Lateral (Final result)  Result time 08/28/20 17:34:53   Procedure changed from X-Ray Chest AP Portable     Final result by Bernardo Vazquez MD (08/28/20 17:34:53)                 Narrative:    2 view chest    CLINICAL DATA: Chest pain    FINDINGS: PA and lateral views are compared to March  2017.    Heart size is normal. The mediastinum is unremarkable. There are no  infiltrates or effusions. There is mild linear scarring in the right  middle lobe, unchanged. No acute osseous abnormality is demonstrated.    IMPRESSION:  1. No acute radiographic abnormalities.  2. Mild linear scarring in the right middle lobe.    Electronically Signed by Zay Vazquez M.D. on 8/28/2020 5:38 PM                            Physical Exam:   Physical Exam   Constitutional: He is oriented to person, place, and time and well-developed, well-nourished, and in no distress. No distress.   Neck: No JVD present.   Cardiovascular: Normal rate, regular rhythm and normal heart sounds.   No murmur heard.  Pulmonary/Chest: Effort normal and breath sounds normal. No respiratory distress. He has no wheezes. He has no rales.   Abdominal: Soft. Bowel sounds are normal. There is no abdominal tenderness.   Musculoskeletal: Normal range of motion.         General: No edema.   Neurological: He is alert and oriented to person, place, and time.   Skin: Skin is warm and dry. He is not diaphoretic.       ASSESSMENT/PLAN:   Assessment:   Non-specific CP  Syncope  HTN   +Utox for THC    Plan:   The patient is clear for discharge if MPI imaging is unremarkable.   Continue current cardiac medications otherwise.  Will plan for outpatient rhythm monitoring and tilt-table test to assess syncope further.    Brendan Velez PA-C  08/30/2020

## 2020-09-03 LAB
BACTERIA BLD CULT: NORMAL
BACTERIA BLD CULT: NORMAL

## 2020-10-13 ENCOUNTER — PATIENT MESSAGE (OUTPATIENT)
Dept: BARIATRICS | Facility: CLINIC | Age: 45
End: 2020-10-13

## 2020-12-29 ENCOUNTER — OFFICE VISIT (OUTPATIENT)
Dept: URGENT CARE | Facility: CLINIC | Age: 45
End: 2020-12-29
Payer: COMMERCIAL

## 2020-12-29 ENCOUNTER — IMMUNIZATION (OUTPATIENT)
Dept: URGENT CARE | Facility: CLINIC | Age: 45
End: 2020-12-29
Payer: COMMERCIAL

## 2020-12-29 VITALS
OXYGEN SATURATION: 98 % | WEIGHT: 274 LBS | RESPIRATION RATE: 14 BRPM | HEART RATE: 80 BPM | BODY MASS INDEX: 38.22 KG/M2 | DIASTOLIC BLOOD PRESSURE: 85 MMHG | SYSTOLIC BLOOD PRESSURE: 125 MMHG

## 2020-12-29 DIAGNOSIS — L03.113 CELLULITIS OF RIGHT UPPER EXTREMITY: Primary | ICD-10-CM

## 2020-12-29 DIAGNOSIS — M79.5 FOREIGN BODY (FB) IN SOFT TISSUE: ICD-10-CM

## 2020-12-29 DIAGNOSIS — Z23 NEED FOR INFLUENZA VACCINATION: ICD-10-CM

## 2020-12-29 PROCEDURE — 90471 PR IMMUNIZ ADMIN,1 SINGLE/COMB VAC/TOXOID: ICD-10-PCS | Mod: S$GLB,,, | Performed by: EMERGENCY MEDICINE

## 2020-12-29 PROCEDURE — 90715 TDAP VACCINE 7 YRS/> IM: CPT | Mod: S$GLB,,, | Performed by: NURSE PRACTITIONER

## 2020-12-29 PROCEDURE — 90715 TDAP VACCINE GREATER THAN OR EQUAL TO 7YO IM: ICD-10-PCS | Mod: S$GLB,,, | Performed by: NURSE PRACTITIONER

## 2020-12-29 PROCEDURE — 99214 PR OFFICE/OUTPT VISIT, EST, LEVL IV, 30-39 MIN: ICD-10-PCS | Mod: 25,S$GLB,, | Performed by: NURSE PRACTITIONER

## 2020-12-29 PROCEDURE — 73130 PR  X-RAY HAND 3+ VW: ICD-10-PCS | Mod: TC,RT,S$GLB, | Performed by: EMERGENCY MEDICINE

## 2020-12-29 PROCEDURE — 99214 OFFICE O/P EST MOD 30 MIN: CPT | Mod: 25,S$GLB,, | Performed by: NURSE PRACTITIONER

## 2020-12-29 PROCEDURE — 90471 TDAP VACCINE GREATER THAN OR EQUAL TO 7YO IM: ICD-10-PCS | Mod: S$GLB,,, | Performed by: NURSE PRACTITIONER

## 2020-12-29 PROCEDURE — 90471 IMMUNIZATION ADMIN: CPT | Mod: S$GLB,,, | Performed by: NURSE PRACTITIONER

## 2020-12-29 PROCEDURE — 90686 IIV4 VACC NO PRSV 0.5 ML IM: CPT | Mod: S$GLB,,, | Performed by: EMERGENCY MEDICINE

## 2020-12-29 PROCEDURE — 73130 X-RAY EXAM OF HAND: CPT | Mod: TC,RT,S$GLB, | Performed by: EMERGENCY MEDICINE

## 2020-12-29 PROCEDURE — 90686 PR FLU VACCINE, QIIV4, NO PRSV, 0.5 ML, IM: ICD-10-PCS | Mod: S$GLB,,, | Performed by: EMERGENCY MEDICINE

## 2020-12-29 PROCEDURE — 90471 IMMUNIZATION ADMIN: CPT | Mod: S$GLB,,, | Performed by: EMERGENCY MEDICINE

## 2020-12-29 RX ORDER — SULFAMETHOXAZOLE AND TRIMETHOPRIM 800; 160 MG/1; MG/1
1 TABLET ORAL 2 TIMES DAILY
Qty: 10 TABLET | Refills: 0 | Status: SHIPPED | OUTPATIENT
Start: 2020-12-29 | End: 2021-01-03

## 2020-12-29 RX ORDER — CEPHALEXIN 500 MG/1
500 CAPSULE ORAL 4 TIMES DAILY
Qty: 20 CAPSULE | Refills: 0 | Status: SHIPPED | OUTPATIENT
Start: 2020-12-29 | End: 2021-01-03

## 2020-12-29 NOTE — PROGRESS NOTES
Subjective:       Patient ID: Shady Snyder is a 45 y.o. male.    Vitals:  vitals were not taken for this visit.     Chief Complaint: Foreign Body in Skin    Shady Snyder is a 45 year old male presenting to the clinic with c/o possible foreign body to the right hand. He got a piece of wood in his palm a couple of days ago that he was able to remove. He states the area is now red and tender. Last tetanus unknown. He denies any fever.     Foreign Body  Incident onset:  Friday  Suspected object: wood  Pertinent negatives include no abdominal pain, chest pain, congestion, cough, fever, sore throat or vomiting. Associated symptoms comments: Right hand splinter in between 1st and second digit with drainage and a blister  Pt has been feeling fatigue also for the pasty three  Days   .       Constitution: Negative for chills, fatigue and fever.   HENT: Negative for congestion and sore throat.    Neck: Negative for painful lymph nodes.   Cardiovascular: Negative for chest pain and leg swelling.   Eyes: Negative for double vision and blurred vision.   Respiratory: Negative for cough and shortness of breath.    Gastrointestinal: Negative for abdominal pain, nausea, vomiting and diarrhea.   Genitourinary: Negative for dysuria, frequency and urgency.   Musculoskeletal: Negative for joint pain, joint swelling, muscle cramps and muscle ache.   Skin: Positive for puncture wound. Negative for color change, pale and rash.   Allergic/Immunologic: Negative for seasonal allergies.   Neurological: Negative for dizziness, history of vertigo, light-headedness, passing out and headaches.   Hematologic/Lymphatic: Negative for swollen lymph nodes, easy bruising/bleeding and history of blood clots. Does not bruise/bleed easily.   Psychiatric/Behavioral: Negative for nervous/anxious, sleep disturbance and depression. The patient is not nervous/anxious.        Objective:      Physical Exam   Constitutional: He is oriented to person,  place, and time. He appears well-developed. He is cooperative.  Non-toxic appearance. He does not appear ill. No distress.   HENT:   Head: Normocephalic and atraumatic.   Ears:   Right Ear: Hearing and external ear normal.   Left Ear: Hearing and external ear normal.   Nose: Nose normal. No mucosal edema, rhinorrhea or nasal deformity. No epistaxis. Right sinus exhibits no maxillary sinus tenderness and no frontal sinus tenderness. Left sinus exhibits no maxillary sinus tenderness and no frontal sinus tenderness.   Mouth/Throat: Uvula is midline, oropharynx is clear and moist and mucous membranes are normal. Mucous membranes are moist. No trismus in the jaw. Normal dentition. No uvula swelling. No posterior oropharyngeal erythema.   Eyes: Conjunctivae and lids are normal. Right eye exhibits no discharge. Left eye exhibits no discharge. No scleral icterus.   Neck: Trachea normal, normal range of motion, full passive range of motion without pain and phonation normal. Neck supple.   Cardiovascular: Normal rate, regular rhythm, normal heart sounds and normal pulses.   Pulmonary/Chest: Effort normal and breath sounds normal. No respiratory distress.   Abdominal: Normal appearance. He exhibits no pulsatile midline mass.   Musculoskeletal: Normal range of motion.         General: No deformity.        Hands:    Neurological: He is alert and oriented to person, place, and time. He exhibits normal muscle tone. Coordination normal.   Skin: Skin is warm, dry, intact, not diaphoretic and not pale. Psychiatric: His speech is normal and behavior is normal. Judgment and thought content normal.   Nursing note and vitals reviewed.        Assessment:       1. Cellulitis of right upper extremity    2. Foreign body (FB) in soft tissue        Plan:       Patient has full ROM of all digits. Do not suspect infectious tenosynovitis. Mild cellulitis. Xray independently interpreted by me with no foreign body noted. Tetanus updated and patient  started on bactrim and keflex. Follow up as needed.     Cellulitis of right upper extremity    Foreign body (FB) in soft tissue  -     X-Ray Hand 3 View Right; Future; Expected date: 12/29/2020    Other orders  -     (In Office Administered) Tdap Vaccine  -     cephALEXin (KEFLEX) 500 MG capsule; Take 1 capsule (500 mg total) by mouth 4 (four) times daily. for 5 days  Dispense: 20 capsule; Refill: 0  -     sulfamethoxazole-trimethoprim 800-160mg (BACTRIM DS) 800-160 mg Tab; Take 1 tablet by mouth 2 (two) times daily. for 5 days  Dispense: 10 tablet; Refill: 0

## 2020-12-29 NOTE — PATIENT INSTRUCTIONS
Discharge Instructions for Cellulitis  You have been diagnosed with cellulitis. This is an infection in the deepest layer of the skin. In some cases, the infection also affects the muscle. Cellulitis is caused by bacteria. The bacteria can enter the body through broken skin. This can happen with a cut, scratch, animal bite, or an insect bite that has been scratched. You may have been treated in the hospital with antibiotics and fluids. You will likely be given a prescription for antibiotics to take at home. This sheet will help you take care of yourself at home.  Home care  When you are home:  · Take the prescribed antibiotic medicine you are given as directed until it is gone. Take it even if you feel better. It treats the infection and stops it from returning. Not taking all the medicine can make future infections hard to treat.  · Keep the infected area clean.  · When possible, raise the infected area above the level of your heart. This helps keep swelling down.  · Talk with your healthcare provider if you are in pain. Ask what kind of over-the-counter medicine you can take for pain.  · Apply clean bandages as advised.  · Take your temperature once a day for a week.  · Wash your hands often to prevent spreading the infection.  In the future, wash your hands before and after you touch cuts, scratches, or bandages. This will help prevent infection.   When to call your healthcare provider  Call your healthcare provider immediately if you have any of the following:  · Difficulty or pain when moving the joints above or below the infected area  · Discharge or pus draining from the area  · Fever of 100.4°F (38°C) or higher, or as directed by your healthcare provider  · Pain that gets worse in or around the infected   · Redness that gets worse in or around the infected area, particularly if the area of redness expands to a wider area  · Shaking chills  · Swelling of the infected area  · Vomiting   Date Last Reviewed:  8/1/2016  © 6057-7932 [employer], [employer]. [address] [place] [address], [place] [address]. All rights reserved. This information is not intended as a substitute for professional medical care. Always follow your healthcare professional's instructions.

## 2021-11-29 ENCOUNTER — OFFICE VISIT (OUTPATIENT)
Dept: URGENT CARE | Facility: CLINIC | Age: 46
End: 2021-11-29
Payer: COMMERCIAL

## 2021-11-29 VITALS
RESPIRATION RATE: 16 BRPM | DIASTOLIC BLOOD PRESSURE: 83 MMHG | OXYGEN SATURATION: 98 % | SYSTOLIC BLOOD PRESSURE: 125 MMHG | BODY MASS INDEX: 37.66 KG/M2 | HEIGHT: 71 IN | WEIGHT: 269 LBS | HEART RATE: 83 BPM | TEMPERATURE: 100 F

## 2021-11-29 DIAGNOSIS — R05.9 COUGH: ICD-10-CM

## 2021-11-29 DIAGNOSIS — J40 BRONCHITIS: Primary | ICD-10-CM

## 2021-11-29 LAB
CTP QC/QA: YES
CTP QC/QA: YES
FLUAV AG NPH QL: NEGATIVE
FLUBV AG NPH QL: NEGATIVE
SARS-COV-2 RDRP RESP QL NAA+PROBE: NEGATIVE

## 2021-11-29 PROCEDURE — 87804 POCT INFLUENZA A/B: ICD-10-PCS | Mod: 59,QW,, | Performed by: NURSE PRACTITIONER

## 2021-11-29 PROCEDURE — 99214 PR OFFICE/OUTPT VISIT, EST, LEVL IV, 30-39 MIN: ICD-10-PCS | Mod: S$GLB,,, | Performed by: NURSE PRACTITIONER

## 2021-11-29 PROCEDURE — U0002 COVID-19 LAB TEST NON-CDC: HCPCS | Mod: QW,S$GLB,, | Performed by: NURSE PRACTITIONER

## 2021-11-29 PROCEDURE — U0002: ICD-10-PCS | Mod: QW,S$GLB,, | Performed by: NURSE PRACTITIONER

## 2021-11-29 PROCEDURE — 99214 OFFICE O/P EST MOD 30 MIN: CPT | Mod: S$GLB,,, | Performed by: NURSE PRACTITIONER

## 2021-11-29 PROCEDURE — 87804 INFLUENZA ASSAY W/OPTIC: CPT | Mod: QW,,, | Performed by: NURSE PRACTITIONER

## 2021-11-29 RX ORDER — PREDNISONE 20 MG/1
20 TABLET ORAL 2 TIMES DAILY
Qty: 10 TABLET | Refills: 0 | Status: SHIPPED | OUTPATIENT
Start: 2021-11-29 | End: 2021-12-04

## 2021-11-29 RX ORDER — ALBUTEROL SULFATE 90 UG/1
2 AEROSOL, METERED RESPIRATORY (INHALATION) EVERY 6 HOURS PRN
Qty: 18 G | Refills: 0 | Status: SHIPPED | OUTPATIENT
Start: 2021-11-29

## 2023-10-18 ENCOUNTER — LAB VISIT (OUTPATIENT)
Dept: LAB | Facility: HOSPITAL | Age: 48
End: 2023-10-18
Attending: INTERNAL MEDICINE
Payer: COMMERCIAL

## 2023-10-18 DIAGNOSIS — R21 RASH AND NONSPECIFIC SKIN ERUPTION: ICD-10-CM

## 2023-10-18 DIAGNOSIS — R39.15 URINARY URGENCY: ICD-10-CM

## 2023-10-18 DIAGNOSIS — I10 ESSENTIAL HYPERTENSION: ICD-10-CM

## 2023-10-18 DIAGNOSIS — R10.9 ABDOMINAL PAIN, UNSPECIFIED ABDOMINAL LOCATION: ICD-10-CM

## 2023-10-18 DIAGNOSIS — Z90.49 S/P PARTIAL COLECTOMY: ICD-10-CM

## 2023-10-18 LAB
BUN SERPL-MCNC: 11 MG/DL (ref 6–20)
CREAT SERPL-MCNC: 1 MG/DL (ref 0.5–1.4)
EST. GFR  (NO RACE VARIABLE): >60 ML/MIN/1.73 M^2

## 2023-10-18 PROCEDURE — 36415 COLL VENOUS BLD VENIPUNCTURE: CPT | Performed by: INTERNAL MEDICINE

## 2023-10-18 PROCEDURE — 82565 ASSAY OF CREATININE: CPT | Performed by: INTERNAL MEDICINE

## 2023-10-18 PROCEDURE — 84520 ASSAY OF UREA NITROGEN: CPT | Performed by: INTERNAL MEDICINE

## 2023-10-25 ENCOUNTER — HOSPITAL ENCOUNTER (OUTPATIENT)
Dept: RADIOLOGY | Facility: HOSPITAL | Age: 48
Discharge: HOME | End: 2023-10-25
Attending: INTERNAL MEDICINE
Payer: COMMERCIAL

## 2023-10-25 PROCEDURE — 74177 CT ABD & PELVIS W/CONTRAST: CPT | Mod: TC

## 2023-10-25 PROCEDURE — 74177 CT ABD & PELVIS W/CONTRAST: CPT | Mod: 26,,, | Performed by: RADIOLOGY

## 2023-10-25 PROCEDURE — 74177 CT ABDOMEN PELVIS WITH CONTRAST: ICD-10-PCS | Mod: 26,,, | Performed by: RADIOLOGY

## 2023-10-25 PROCEDURE — 25500020 PHARM REV CODE 255: Performed by: INTERNAL MEDICINE

## 2023-10-25 RX ADMIN — IOHEXOL 100 ML: 350 INJECTION, SOLUTION INTRAVENOUS at 11:10

## 2023-12-14 ENCOUNTER — HOSPITAL ENCOUNTER (OUTPATIENT)
Dept: PREADMISSION TESTING | Facility: HOSPITAL | Age: 48
Discharge: HOME | End: 2023-12-14
Attending: INTERNAL MEDICINE
Payer: COMMERCIAL

## 2023-12-14 VITALS
HEART RATE: 68 BPM | RESPIRATION RATE: 16 BRPM | TEMPERATURE: 99 F | OXYGEN SATURATION: 97 % | WEIGHT: 258 LBS | SYSTOLIC BLOOD PRESSURE: 121 MMHG | DIASTOLIC BLOOD PRESSURE: 79 MMHG | BODY MASS INDEX: 36.12 KG/M2 | HEIGHT: 71 IN

## 2023-12-14 DIAGNOSIS — Z01.818 PRE-OP TESTING: Primary | ICD-10-CM

## 2023-12-14 NOTE — PLAN OF CARE
To confirm, Your doctor has instructed you that procedure is scheduled for: 12/19/2023    1 Person can come with you the day of surgery     Endoscopy nurse will call the afternoon prior to surgery with your arrival time.  On Monday 12/18    Please  Enter at [employer] and come through front entrance.       Check in at registration.     After registration, proceed past gift shop and through glass door ( [facility]) Check in at the nurses station to the left.     FOLLOW CLEAR LIQUIDS AND COLON PREP AS DIRECTED BY PHYSICIAN      Do not eat or drink anything after midnight the night before your surgery - THIS INCLUDES  WATER, GUM, MINTS AND CANDY.  YOU MAY BRUSH YOUR TEETH BUT DO NOT SWALLOW       TAKE ONLY THESE MEDICATIONS WITH A SMALL SIP OF WATER THE MORNING OF YOUR PROCEDURE: None, can use inhalers if needed            PLEASE NOTE:  The surgery schedule has many variables which may affect the time of your surgery case.  Family members should be available if your surgery time changes.  Plan to be here the day of your procedure between 4-6 hours.      DO NOT TAKE THESE MEDICATIONS 5-7 DAYS PRIOR to your procedure or per your surgeon's request: ASPIRIN, ALEVE, ADVIL, IBUPROFEN,  AURY SELTZER, BC , FISH OIL , VITAMIN E, HERBALS  (May take Tylenol)      ONLY if you are prescribed any types of blood thinners such as:  Aspirin, Coumadin, Plavix, Pradaxa, Xarelto, Aggrenox, Effient, Eliquis, Savasya, Brilinta, or any other, ask your surgeon whether you should stop taking them and how long before surgery you should stop.  You may also need to verify with the prescribing physician if it is ok to stop your medication.                                                        IMPORTANT INSTRUCTIONS      Do not smoke, vape or drink alcoholic beverages 24 hours prior to your procedure.         If you wear contact lenses, dentures, hearing aids or glasses, bring a container to put them in during surgery and give to a  family member for safe keeping.    Please leave all jewelry, piercing's and valuables at home.   Wear rubber soled shoes (no flip flops).  ONLY for patients requiring bowel prep, written instructions will be given by your doctor's office.  If your doctor has scheduled you for an overnight stay, bring a small overnight bag with any personal items you need.    Make arrangements in advance for transportation home by a responsible adult.      You must make arrangements for transportation, TAXI'S, UBER'S OR LYFTS ARE NOT ALLOWED.        If you have any questions about these instructions, call (Monday - Friday)  Endoscopy [phone]

## 2023-12-14 NOTE — PLAN OF CARE
PAT visit complete. Labs ordered. Patient stated he just saw cardiologist yesterday Dr. Derek Zepdea and had EKG done and received cardiac clearance-having these faxed to us. Instructions given, AVS reviewed. All questions answered.

## 2023-12-19 ENCOUNTER — HOSPITAL ENCOUNTER (OUTPATIENT)
Facility: HOSPITAL | Age: 48
Discharge: HOME | End: 2023-12-19
Attending: INTERNAL MEDICINE | Admitting: INTERNAL MEDICINE
Payer: COMMERCIAL

## 2023-12-19 ENCOUNTER — ANESTHESIA (OUTPATIENT)
Dept: SURGERY | Facility: HOSPITAL | Age: 48
End: 2023-12-19
Payer: COMMERCIAL

## 2023-12-19 ENCOUNTER — ANESTHESIA EVENT (OUTPATIENT)
Dept: SURGERY | Facility: HOSPITAL | Age: 48
End: 2023-12-19
Payer: COMMERCIAL

## 2023-12-19 VITALS
OXYGEN SATURATION: 97 % | HEART RATE: 70 BPM | DIASTOLIC BLOOD PRESSURE: 61 MMHG | RESPIRATION RATE: 17 BRPM | SYSTOLIC BLOOD PRESSURE: 124 MMHG | TEMPERATURE: 98 F

## 2023-12-19 VITALS
HEART RATE: 103 BPM | SYSTOLIC BLOOD PRESSURE: 111 MMHG | OXYGEN SATURATION: 96 % | RESPIRATION RATE: 25 BRPM | DIASTOLIC BLOOD PRESSURE: 71 MMHG

## 2023-12-19 DIAGNOSIS — R19.4 CHANGE IN BOWEL HABITS: ICD-10-CM

## 2023-12-19 PROCEDURE — 27201114 HC TRAP (ANY): Performed by: INTERNAL MEDICINE

## 2023-12-19 PROCEDURE — 37000009 HC ANESTHESIA EA ADD 15 MINS: Performed by: INTERNAL MEDICINE

## 2023-12-19 PROCEDURE — 25000003 PHARM REV CODE 250: Performed by: INTERNAL MEDICINE

## 2023-12-19 PROCEDURE — 43248 EGD GUIDE WIRE INSERTION: CPT

## 2023-12-19 PROCEDURE — 45385 COLONOSCOPY W/LESION REMOVAL: CPT | Performed by: INTERNAL MEDICINE

## 2023-12-19 PROCEDURE — 37000008 HC ANESTHESIA 1ST 15 MINUTES: Performed by: INTERNAL MEDICINE

## 2023-12-19 PROCEDURE — 27200043 HC FORCEPS, BIOPSY: Performed by: INTERNAL MEDICINE

## 2023-12-19 PROCEDURE — D9220A PRA ANESTHESIA: Mod: ANES,,, | Performed by: ANESTHESIOLOGY

## 2023-12-19 PROCEDURE — D9220A PRA ANESTHESIA: ICD-10-PCS | Mod: CRNA,,, | Performed by: NURSE ANESTHETIST, CERTIFIED REGISTERED

## 2023-12-19 PROCEDURE — 25000003 PHARM REV CODE 250: Performed by: NURSE ANESTHETIST, CERTIFIED REGISTERED

## 2023-12-19 PROCEDURE — 63600175 PHARM REV CODE 636 W HCPCS: Performed by: NURSE ANESTHETIST, CERTIFIED REGISTERED

## 2023-12-19 PROCEDURE — 43239 EGD BIOPSY SINGLE/MULTIPLE: CPT | Performed by: INTERNAL MEDICINE

## 2023-12-19 PROCEDURE — 43270 EGD LESION ABLATION: CPT | Performed by: INTERNAL MEDICINE

## 2023-12-19 PROCEDURE — D9220A PRA ANESTHESIA: ICD-10-PCS | Mod: ANES,,, | Performed by: ANESTHESIOLOGY

## 2023-12-19 PROCEDURE — 45380 COLONOSCOPY AND BIOPSY: CPT | Performed by: INTERNAL MEDICINE

## 2023-12-19 PROCEDURE — 27201089 HC SNARE, DISP (ANY): Performed by: INTERNAL MEDICINE

## 2023-12-19 PROCEDURE — C1769 GUIDE WIRE: HCPCS | Performed by: INTERNAL MEDICINE

## 2023-12-19 PROCEDURE — 63600175 PHARM REV CODE 636 W HCPCS: Performed by: ANESTHESIOLOGY

## 2023-12-19 PROCEDURE — 96365 THER/PROPH/DIAG IV INF INIT: CPT | Performed by: INTERNAL MEDICINE

## 2023-12-19 PROCEDURE — D9220A PRA ANESTHESIA: Mod: CRNA,,, | Performed by: NURSE ANESTHETIST, CERTIFIED REGISTERED

## 2023-12-19 RX ORDER — PROPOFOL 10 MG/ML
VIAL (ML) INTRAVENOUS
Status: DISCONTINUED | OUTPATIENT
Start: 2023-12-19 | End: 2023-12-19

## 2023-12-19 RX ORDER — ACETAMINOPHEN 10 MG/ML
1000 INJECTION, SOLUTION INTRAVENOUS ONCE
Status: COMPLETED | OUTPATIENT
Start: 2023-12-19 | End: 2023-12-19

## 2023-12-19 RX ORDER — ONDANSETRON 2 MG/ML
4 INJECTION INTRAMUSCULAR; INTRAVENOUS ONCE
Status: COMPLETED | OUTPATIENT
Start: 2023-12-19 | End: 2023-12-19

## 2023-12-19 RX ADMIN — SODIUM CHLORIDE: 0.9 INJECTION, SOLUTION INTRAVENOUS at 01:12

## 2023-12-19 RX ADMIN — PROPOFOL 50 MG: 10 INJECTION, EMULSION INTRAVENOUS at 02:12

## 2023-12-19 RX ADMIN — ONDANSETRON 4 MG: 2 INJECTION INTRAMUSCULAR; INTRAVENOUS at 03:12

## 2023-12-19 RX ADMIN — ACETAMINOPHEN 1000 MG: 10 INJECTION INTRAVENOUS at 03:12

## 2023-12-19 RX ADMIN — PROPOFOL 100 MG: 10 INJECTION, EMULSION INTRAVENOUS at 02:12

## 2023-12-19 RX ADMIN — GLYCOPYRROLATE 0.2 MG: 0.2 INJECTION, SOLUTION INTRAMUSCULAR; INTRAVITREAL at 02:12

## 2023-12-19 NOTE — ANESTHESIA POSTPROCEDURE EVALUATION
Anesthesia Post Evaluation    Patient: Shady Snyder    Procedure(s) Performed: Procedure(s) (LRB):  COLONOSCOPY (N/A)  EGD (ESOPHAGOGASTRODUODENOSCOPY) (N/A)    Final Anesthesia Type: general      Patient location during evaluation: GI PACU  Patient participation: Yes- Able to Participate  Level of consciousness: awake and alert and oriented  Post-procedure vital signs: reviewed and stable  Pain management: adequate  Airway patency: patent    PONV status at discharge: No PONV  Anesthetic complications: no      Cardiovascular status: blood pressure returned to baseline  Respiratory status: unassisted, spontaneous ventilation and room air  Hydration status: euvolemic  Follow-up not needed.              Vitals Value Taken Time   /61 12/19/23 1515   Temp 36.8 °C (98.2 °F) 12/19/23 1435   Pulse 74 12/19/23 1519   Resp 17 12/19/23 1519   SpO2 97 % 12/19/23 1519   Vitals shown include unvalidated device data.      No case tracking events are documented in the log.      Pain/Lilibeth Score: Pain Rating Prior to Med Admin: 9 (12/19/2023  3:06 PM)  Lilibeth Score: 10 (12/19/2023  2:55 PM)

## 2023-12-19 NOTE — ANESTHESIA PREPROCEDURE EVALUATION
12/19/2023  Shady Snyder is a 48 y.o., male.      Patient Active Problem List   Diagnosis    Essential hypertension       Past Surgical History:   Procedure Laterality Date    CHOLECYSTECTOMY      COLON SURGERY N/A 02/22/2021    12 inches of colon removed    FRACTURE SURGERY      rt foot surgery      VASECTOMY          Tobacco Use:  The patient  reports that he has never smoked. He has never used smokeless tobacco.     Results for orders placed or performed during the hospital encounter of 08/28/20   EKG 12-lead    Collection Time: 08/28/20  5:14 PM    Narrative    Test Reason : R07.9,    Vent. Rate : 070 BPM     Atrial Rate : 070 BPM     P-R Int : 138 ms          QRS Dur : 100 ms      QT Int : 370 ms       P-R-T Axes : 033 009 011 degrees     QTc Int : 399 ms    Normal sinus rhythm  Normal ECG  When compared with ECG of 17-AUG-2020 13:22,  No significant change was found  Confirmed by Gabino Warner MD ([id number]) on 8/30/2020 9:35:23 PM    Referred By: [email or web address]   SELF           Confirmed By:Gabino Warner MD             Lab Results   Component Value Date    WBC 7.59 12/14/2023    HGB 14.2 12/14/2023    HCT 43.8 12/14/2023    MCV 91 12/14/2023     12/14/2023     BMP  Lab Results   Component Value Date     12/14/2023    K 4.2 12/14/2023     12/14/2023    CO2 26 12/14/2023    BUN 10 12/14/2023    CREATININE 1.1 12/14/2023    CALCIUM 9.4 12/14/2023    ANIONGAP 7 (L) 12/14/2023     (H) 12/14/2023     10/11/2023     06/16/2023       Results for orders placed during the hospital encounter of 08/28/20    Echo Color Flow Doppler? Yes    Interpretation Summary  · The estimated PA systolic pressure is 26 mmHg.  · Normal left ventricular systolic function. The estimated ejection fraction is 55%.  · Normal LV diastolic function.  · Mild aortic regurgitation.  · Mild  tricuspid regurgitation.  · Normal central venous pressure (3 mmHg).              Pre-op Assessment    I have reviewed the Patient Summary Reports.     I have reviewed the Nursing Notes. I have reviewed the NPO Status.   I have reviewed the Medications.     Review of Systems  Anesthesia Hx:  No problems with previous Anesthesia             Denies Family Hx of Anesthesia complications.    Denies Personal Hx of Anesthesia complications.                    Social:  Non-Smoker       Hematology/Oncology:  Hematology Normal                                     Cardiovascular:     Hypertension, well controlled                                        Pulmonary:    Asthma mild   Sleep Apnea, CPAP Sinus congestion, hx sinusitis               Renal/:   renal calculi               Hepatic/GI:     GERD   Hx of colon obstruction, s/p colon resection, reports frequent dysphagia          Musculoskeletal:  Musculoskeletal Normal                Neurological:  Neurology Normal                                      Endocrine:  Endocrine Normal                Physical Exam  General: Well nourished, Cooperative, Alert and Oriented    Airway:  Mallampati: III / II  Mouth Opening: Normal  TM Distance: Normal  Tongue: Normal  Neck ROM: Normal ROM    Dental:  Intact    Chest/Lungs:  Clear to auscultation    Heart:  Rate: Normal  Rhythm: Regular Rhythm  Sounds: Normal    Abdomen:  Normal, Soft, Nontender        Anesthesia Plan  Type of Anesthesia, risks & benefits discussed:    Anesthesia Type: Gen Natural Airway  Intra-op Monitoring Plan: Standard ASA Monitors  Post Op Pain Control Plan:   (medical reason for not using multimodal pain management)  Induction:  IV  Informed Consent: Informed consent signed with the Patient and all parties understand the risks and agree with anesthesia plan.  All questions answered. Patient consented to blood products? No  ASA Score: 3  Anesthesia Plan Notes:   General Natural  Airway  POM  Propofol  Zofran    Ready For Surgery From Anesthesia Perspective.     .

## 2023-12-19 NOTE — [employer] PATIENT INSTRUCTIONS
Discharge Summary/Instructions after an Endoscopic Procedure  Patient Name: Shady Snyder  Patient MRN: [id number]  Patient YOB: 1975 Tuesday, December 19, 2023  Ervin Kirkpatrick III, MD  RESTRICTIONS:  During your procedure today, you received medications for sedation.  These   medications may affect your judgment, balance and coordination.  Therefore,   for 24 hours, you have the following restrictions:   - DO NOT drive a car, operate machinery, make legal/financial decisions,   sign important papers or drink alcohol.    ACTIVITY:  Today: no heavy lifting, straining or running due to procedural   sedation/anesthesia.  The following day: return to full activity including work.  DIET:  Eat and drink normally unless instructed otherwise.     TREATMENT FOR COMMON SIDE EFFECTS:  - Mild abdominal pain, nausea, belching, bloating or excessive gas:  rest,   eat lightly and use a heating pad.  - Sore Throat: treat with throat lozenges and/or gargle with warm salt   water.  - Because air was used during the procedure, expelling large amounts of air   from your rectum or belching is normal.  - If a bowel prep was taken, you may not have a bowel movement for 1-3 days.    This is normal.  SYMPTOMS TO WATCH FOR AND REPORT TO YOUR PHYSICIAN:  1. Abdominal pain or bloating, other than gas cramps.  2. Chest pain.  3. Back pain.  4. Signs of infection such as: chills or fever occurring within 24 hours   after the procedure.  5. Rectal bleeding, which would show as bright red, maroon, or black stools.   (A tablespoon of blood from the rectum is not serious, especially if   hemorrhoids are present.)  6. Vomiting.  7. Weakness or dizziness.  GO DIRECTLY TO THE NEAREST EMERGENCY ROOM IF YOU HAVE ANY OF THE FOLLOWING:      Difficulty breathing              Chills and/or fever over 101 F   Persistent vomiting and/or vomiting blood   Severe abdominal pain   Severe chest pain   Black, tarry stools   Bleeding- more than one  tablespoon   Any other symptom or condition that you feel may need urgent attention  Your doctor recommends these additional instructions:  If any biopsies were taken, your doctors clinic will contact you in 1 to 2   weeks with any results.  - Discharge patient to home (ambulatory).   - Patient has a contact number available for emergencies.  The signs and   symptoms of potential delayed complications were discussed with the   patient.  Return to normal activities tomorrow.  Written discharge   instructions were provided to the patient.   - Resume previous diet.   - Continue present medications.   - Repeat colonoscopy in 5 years for surveillance.  For questions, problems or results please call your physician - Ervin Kirkpatrick III, MD at Work:  [phone].  [facility], EMERGENCY ROOM PHONE NUMBER: [phone]  IF A COMPLICATION OR EMERGENCY SITUATION ARISES AND YOU ARE UNABLE TO REACH   YOUR PHYSICIAN - GO DIRECTLY TO THE EMERGENCY ROOM.  Ervin Kirkpatrick III, MD  12/19/2023 2:41:37 PM  This report has been verified and signed electronically.  Dear patient,  As a result of recent federal legislation (The Federal Cures Act), you may   receive lab or pathology results from your procedure in your [facility]   account before your physician is able to contact you. Your physician or   their representative will relay the results to you with their   recommendations at their soonest availability.  Thank you,  [employer]

## 2023-12-19 NOTE — [employer] PATIENT INSTRUCTIONS
Discharge Summary/Instructions after an Endoscopic Procedure  Patient Name: Shady Snyder  Patient MRN: [id number]  Patient YOB: 1975 Tuesday, December 19, 2023  Ervin Kirkpatrick III, MD  RESTRICTIONS:  During your procedure today, you received medications for sedation.  These   medications may affect your judgment, balance and coordination.  Therefore,   for 24 hours, you have the following restrictions:   - DO NOT drive a car, operate machinery, make legal/financial decisions,   sign important papers or drink alcohol.    ACTIVITY:  Today: no heavy lifting, straining or running due to procedural   sedation/anesthesia.  The following day: return to full activity including work.  DIET:  Eat and drink normally unless instructed otherwise.     TREATMENT FOR COMMON SIDE EFFECTS:  - Mild abdominal pain, nausea, belching, bloating or excessive gas:  rest,   eat lightly and use a heating pad.  - Sore Throat: treat with throat lozenges and/or gargle with warm salt   water.  - Because air was used during the procedure, expelling large amounts of air   from your rectum or belching is normal.  - If a bowel prep was taken, you may not have a bowel movement for 1-3 days.    This is normal.  SYMPTOMS TO WATCH FOR AND REPORT TO YOUR PHYSICIAN:  1. Abdominal pain or bloating, other than gas cramps.  2. Chest pain.  3. Back pain.  4. Signs of infection such as: chills or fever occurring within 24 hours   after the procedure.  5. Rectal bleeding, which would show as bright red, maroon, or black stools.   (A tablespoon of blood from the rectum is not serious, especially if   hemorrhoids are present.)  6. Vomiting.  7. Weakness or dizziness.  GO DIRECTLY TO THE NEAREST EMERGENCY ROOM IF YOU HAVE ANY OF THE FOLLOWING:      Difficulty breathing              Chills and/or fever over 101 F   Persistent vomiting and/or vomiting blood   Severe abdominal pain   Severe chest pain   Black, tarry stools   Bleeding- more than one  tablespoon   Any other symptom or condition that you feel may need urgent attention  Your doctor recommends these additional instructions:  If any biopsies were taken, your doctors clinic will contact you in 1 to 2   weeks with any results.  - Patient has a contact number available for emergencies.  The signs and   symptoms of potential delayed complications were discussed with the   patient.  Return to normal activities tomorrow.  Written discharge   instructions were provided to the patient.   - Discharge patient to home (with escort).   - Await pathology results.   - Start on PPI  For questions, problems or results please call your physician - Ervin Kirkpatrick III, MD at Work:  [phone].  [facility], EMERGENCY ROOM PHONE NUMBER: [phone]  IF A COMPLICATION OR EMERGENCY SITUATION ARISES AND YOU ARE UNABLE TO REACH   YOUR PHYSICIAN - GO DIRECTLY TO THE EMERGENCY ROOM.  Ervin Kirkpatrick III, MD  12/19/2023 2:38:34 PM  This report has been verified and signed electronically.  Dear patient,  As a result of recent federal legislation (The Federal Cures Act), you may   receive lab or pathology results from your procedure in your [facility]   account before your physician is able to contact you. Your physician or   their representative will relay the results to you with their   recommendations at their soonest availability.  Thank you,  [employer]OVATION

## 2023-12-19 NOTE — H&P
GASTROENTEROLOGY PRE-PROCEDURE H&P NOTE  Patient Name: Shady Snyder  Patient MRN: [id number]  Patient : 1975    Service date: 2023    PCP: Nancy Beavers III, MD    No chief complaint on file.      HPI: Patient is a 48 y.o. male with PMHx as below here for evaluation of epig pain / melena / diverticulitis / change in bowels.     Past Medical History:  Past Medical History:   Diagnosis Date    Diverticulitis     GERD (gastroesophageal reflux disease)     HTN (hypertension)     Kidney stone     Sleep apnea         Past Surgical History:  Past Surgical History:   Procedure Laterality Date    CHOLECYSTECTOMY      COLON SURGERY N/A 2021    12 inches of colon removed    FRACTURE SURGERY      rt foot surgery      VASECTOMY          Home Medications:  Medications Prior to Admission   Medication Sig Dispense Refill Last Dose    lisinopriL (PRINIVIL,ZESTRIL) 20 MG tablet Take 20 mg by mouth once daily.   2023    albuterol (PROVENTIL HFA) 90 mcg/actuation inhaler Inhale 2 puffs into the lungs every 6 (six) hours as needed for Wheezing. Rescue 18 g 0     aspirin 81 MG Chew Take 81 mg by mouth once daily.   More than a month    fexofenadine (ALLEGRA) 180 MG tablet Take 180 mg by mouth once daily.       fluticasone propionate (FLONASE) 50 mcg/actuation nasal spray 2 sprays (100 mcg total) by Each Nostril route daily as needed for Rhinitis or Allergies. 16 g 11     multivitamin (THERAGRAN) per tablet Take 1 tablet by mouth once daily.                  Review of patient's allergies indicates:   Allergen Reactions    Celebrex [celecoxib]        Social History:   Social History     Occupational History    Not on file   Tobacco Use    Smoking status: Never    Smokeless tobacco: Never   Substance and Sexual Activity    Alcohol use: Not Currently     Alcohol/week: 3.0 standard drinks of alcohol     Types: 2 Cans of beer, 1 Standard drinks or equivalent per week     Comment: Socially    Drug use: Yes     Types:  "Marijuana     Comment: daily for pain-can't take pain pills    Sexual activity: Not on file       Family History:   Family History   Problem Relation Age of Onset    Cancer Maternal Grandmother     Cancer Maternal Grandfather     Heart disease Paternal Grandfather        Review of Systems:  A 10 point review of systems was performed and was normal, except as mentioned in the HPI, including constitutional, HEENT, heme, lymph, cardiovascular, respiratory, gastrointestinal, genitourinary, neurologic, endocrine, psychiatric and musculoskeletal.      OBJECTIVE:    Physical Exam:  24 Hour Vital Sign Ranges: Temp:  [97.5 °F (36.4 °C)] 97.5 °F (36.4 °C)  Pulse:  [69] 69  Resp:  [16] 16  SpO2:  [98 %] 98 %  BP: (104)/(68) 104/68  Most recent vitals: /68 (BP Location: Left arm, Patient Position: Lying)   Pulse 69   Temp 97.5 °F (36.4 °C) (Oral)   Resp 16   SpO2 98% Comment: ROOM AIR   GEN: well-developed, well-nourished, awake and alert, non-toxic appearing adult  HEENT: PERRL, sclera anicteric, oral mucosa pink and moist without lesion  NECK: trachea midline; Good ROM  CV: regular rate and rhythm, no murmurs or gallops  RESP: clear to auscultation bilaterally, no wheezes, rhonci or rales  ABD: soft, non-tender, non-distended, normal bowel sounds  EXT: no swelling or edema, 2+ pulses distally  SKIN: no rashes or jaundice  PSYCH: normal affect    Labs:   No results for input(s): "WBC", "MCV", "PLT" in the last 72 hours.    Invalid input(s): "HGBAU"  No results for input(s): "NA", "K", "CL", "CO2", "BUN", "GLU" in the last 72 hours.    Invalid input(s): "CREA"  No results for input(s): "ALB" in the last 72 hours.    Invalid input(s): "ALKP", "SGOT", "SGPT", "TBIL", "DBIL", "TPRO"  No results for input(s): "PT", "INR", "PTT" in the last 72 hours.      IMPRESSION / RECOMMENDATIONS:  EGD / Colon  with interventions as warranted.   RIsks, benefits, alternatives discussed in detail regarding upcoming procedures and " sedation. Some of the more common endoscopic complications include but not limited to immediate or delayed perforation, bleeding, infections, pain, inadvertent injury to surrounding tissue / organs and possible need for surgical evaluation. Patient expressed understanding, all questions answered and will proceed with procedure as planned.     Ervin Kirkpatrick III  12/19/2023  2:00 PM

## 2023-12-19 NOTE — TRANSFER OF CARE
Anesthesia Transfer of Care Note    Patient: Shady Snyder    Procedure(s) Performed: Procedure(s) (LRB):  COLONOSCOPY (N/A)  EGD (ESOPHAGOGASTRODUODENOSCOPY) (N/A)    Patient location: GI    Anesthesia Type: general    Transport from OR: Transported from OR on room air with adequate spontaneous ventilation    Post pain: adequate analgesia    Post assessment: no apparent anesthetic complications and tolerated procedure well    Post vital signs: stable    Level of consciousness: responds to stimulation    Nausea/Vomiting: no nausea/vomiting    Complications: none    Transfer of care protocol was followed      Last vitals: Visit Vitals  /68 (BP Location: Left arm, Patient Position: Lying)   Pulse 69   Temp 36.4 °C (97.5 °F) (Oral)   Resp 16   SpO2 98%

## 2024-01-09 ENCOUNTER — TELEPHONE (OUTPATIENT)
Dept: GASTROENTEROLOGY | Facility: CLINIC | Age: 49
End: 2024-01-09
Payer: COMMERCIAL

## 2024-01-09 NOTE — TELEPHONE ENCOUNTER
Placed call to patient to advise that if she is having issues s/p bowel resection and possible parasites in his stool he will need to follow up with the GI provider he is established with.

## 2024-04-29 ENCOUNTER — HOSPITAL ENCOUNTER (OUTPATIENT)
Dept: RADIOLOGY | Facility: HOSPITAL | Age: 49
Discharge: HOME | End: 2024-04-29
Payer: COMMERCIAL

## 2024-04-29 ENCOUNTER — OFFICE VISIT (OUTPATIENT)
Dept: URGENT CARE | Facility: CLINIC | Age: 49
End: 2024-04-29
Payer: COMMERCIAL

## 2024-04-29 VITALS
HEIGHT: 71 IN | WEIGHT: 240 LBS | BODY MASS INDEX: 33.6 KG/M2 | OXYGEN SATURATION: 97 % | TEMPERATURE: 97 F | SYSTOLIC BLOOD PRESSURE: 108 MMHG | RESPIRATION RATE: 16 BRPM | HEART RATE: 75 BPM | DIASTOLIC BLOOD PRESSURE: 80 MMHG

## 2024-04-29 DIAGNOSIS — L60.0 INGROWN TOENAIL OF RIGHT FOOT: ICD-10-CM

## 2024-04-29 DIAGNOSIS — R10.12 LEFT UPPER QUADRANT ABDOMINAL PAIN: ICD-10-CM

## 2024-04-29 DIAGNOSIS — B83.9 WORMS IN STOOL: ICD-10-CM

## 2024-04-29 DIAGNOSIS — R10.12 LEFT UPPER QUADRANT ABDOMINAL PAIN: Primary | ICD-10-CM

## 2024-04-29 LAB
CREAT SERPL-MCNC: 1.3 MG/DL (ref 0.5–1.4)
SAMPLE: NORMAL

## 2024-04-29 PROCEDURE — 25500020 PHARM REV CODE 255

## 2024-04-29 PROCEDURE — 74177 CT ABD & PELVIS W/CONTRAST: CPT | Mod: 26,,, | Performed by: RADIOLOGY

## 2024-04-29 PROCEDURE — 11750 EXCISION NAIL&NAIL MATRIX: CPT | Mod: T5,S$GLB,,

## 2024-04-29 PROCEDURE — 74177 CT ABD & PELVIS W/CONTRAST: CPT | Mod: TC

## 2024-04-29 PROCEDURE — 99214 OFFICE O/P EST MOD 30 MIN: CPT | Mod: 25,S$GLB,,

## 2024-04-29 PROCEDURE — A9698 NON-RAD CONTRAST MATERIALNOC: HCPCS

## 2024-04-29 RX ORDER — ALBENDAZOLE 200 MG/1
400 TABLET, FILM COATED ORAL ONCE
Qty: 2 TABLET | Refills: 0 | Status: SHIPPED | OUTPATIENT
Start: 2024-04-29 | End: 2024-04-29

## 2024-04-29 RX ORDER — ONDANSETRON 4 MG/1
4 TABLET, ORALLY DISINTEGRATING ORAL EVERY 6 HOURS PRN
Qty: 20 TABLET | Refills: 0 | Status: SHIPPED | OUTPATIENT
Start: 2024-04-29

## 2024-04-29 RX ORDER — POLYETHYLENE GLYCOL 3350, SODIUM SULFATE ANHYDROUS, SODIUM BICARBONATE, SODIUM CHLORIDE, POTASSIUM CHLORIDE 236; 22.74; 6.74; 5.86; 2.97 G/4L; G/4L; G/4L; G/4L; G/4L
240 POWDER, FOR SOLUTION ORAL ONCE
Qty: 240 ML | Refills: 0 | Status: SHIPPED | OUTPATIENT
Start: 2024-04-29 | End: 2024-04-29

## 2024-04-29 RX ORDER — DICYCLOMINE HYDROCHLORIDE 10 MG/1
10 CAPSULE ORAL
Qty: 120 CAPSULE | Refills: 0 | Status: SHIPPED | OUTPATIENT
Start: 2024-04-29 | End: 2024-05-29

## 2024-04-29 RX ORDER — DOCUSATE SODIUM 100 MG/1
100 CAPSULE, LIQUID FILLED ORAL 2 TIMES DAILY
Qty: 60 CAPSULE | Refills: 0 | Status: SHIPPED | OUTPATIENT
Start: 2024-04-29 | End: 2024-05-29

## 2024-04-29 RX ADMIN — IOHEXOL 100 ML: 350 INJECTION, SOLUTION INTRAVENOUS at 01:04

## 2024-04-29 RX ADMIN — IOHEXOL 1000 ML: 12 SOLUTION ORAL at 01:04

## 2024-04-29 NOTE — PATIENT INSTRUCTIONS
[facility] for stat CT abdomen    Continue using mupirocin ointment on toenail.

## 2024-04-29 NOTE — PROGRESS NOTES
"Subjective:      Patient ID: Shady Snyder is a 48 y.o. male.    Vitals:  height is 5' 11" (1.803 m) and weight is 108.9 kg (240 lb). His temperature is 97.4 °F (36.3 °C). His blood pressure is 108/80 and his pulse is 75. His respiration is 16 and oxygen saturation is 97%.     Chief Complaint: Abdominal Pain    Patient reports 2-3 lb daily weight loss for the last 8 months.  States he has had numerous parasitic infections including liver flukes.  Has been seen numerous times, and had negative stool studies.  Has been prescribed albendazole.  Patient also reports going to vet and requesting medications for livestock and self administering.  Reports severe left upper quadrant abdominal pain, constipation, nausea, and vomiting.  He describes abdominal pain as glasses moving through the intestine.  Has a previous bowel resection for diverticulitis.  He had residual Flagyl left over from previous infections which he began taking.  States he took 1 pill.  He was requesting more antibiotics for stomach infection.    He was also complaining of a right great toe medial nail ingrown toenail.  He is tried to excise nail himself but could not remove a small portion.          Constitution: Positive for chills, sweating and fever.   HENT: Negative.     Neck: neck negative.   Cardiovascular: Negative.    Eyes: Negative.    Respiratory: Negative.     Gastrointestinal:  Positive for abdominal pain, history of abdominal surgery, nausea, vomiting and constipation.        Helminth   Endocrine: negative.   Genitourinary: Negative.    Skin: Negative.  Positive for skin thickening/induration (Right great medial toenail infection).   Allergic/Immunologic: Positive for immunizations up-to-date.   Neurological: Negative.    Hematologic/Lymphatic: Negative.    Psychiatric/Behavioral: Negative.        Objective:     Physical Exam   Constitutional: He is oriented to person, place, and time. He appears well-developed.   HENT:   Head: " Normocephalic and atraumatic.   Ears:   Right Ear: External ear normal.   Left Ear: External ear normal.   Nose: Nose normal.   Mouth/Throat: Mucous membranes are normal. Mucous membranes are moist. Oropharynx is clear.   Eyes: Conjunctivae and lids are normal. Pupils are equal, round, and reactive to light. Extraocular movement intact   Neck: Trachea normal. Neck supple.   Cardiovascular: Normal rate, regular rhythm and normal heart sounds.   Pulmonary/Chest: Effort normal and breath sounds normal. No respiratory distress.   Abdominal: Normal appearance. He exhibits no shifting dullness, no distension and no mass. Soft. flat abdomen protuberant There is abdominal tenderness in the right lower quadrant, epigastric area and left upper quadrant. There is left CVA tenderness. There is no guarding.      Comments: Previous well healed surgical scars.   Musculoskeletal: Normal range of motion.         General: Normal range of motion.      Right foot: Right great toe: Injuries: nailbed injury.        Feet:    Neurological: He is alert and oriented to person, place, and time. He has normal strength.   Skin: Skin is warm, dry, intact, not diaphoretic and not pale.   Psychiatric: He experiences Normal attention. His behavior is normal. Mood, judgment and thought content normal. His speech is rapid and/or pressured.   Nursing note and vitals reviewed.      Assessment:     1. Left upper quadrant abdominal pain    2. Ingrown toenail of right foot        Plan:       Left upper quadrant abdominal pain  -     Cancel: CT Abdomen Pelvis W Wo Contrast; Future; Expected date: 04/29/2024  -     CT Abdomen Pelvis W Wo Contrast; Future; Expected date: 04/29/2024    Ingrown toenail of right foot  -     Nail Removal    Other orders  -     ondansetron (ZOFRAN-ODT) 4 MG TbDL; Take 1 tablet (4 mg total) by mouth every 6 (six) hours as needed (nausea).  Dispense: 20 tablet; Refill: 0    Images patient presented appears to be gross amounts of  mucus in stool.  Has constipation, and is having small hard painful bowel movements.  Left upper quadrant pain radiates to back, also having right lower quadrant pain with no rebound tenderness.  I do not suspect protozoal or helminth infection.  We will send for CT to rule out diverticulitis and pancreatitis.  Discussed treatment options with the patient should repeat Albenza, and lactulose if CT negative for acute abdominal infection.  Patient should also follow up with gastroenterologist.  Discussed his chronic abdominal pain may also be related to possible adhesions from previous colon resection.      CT Abdomen Pelvis With IV Contrast Routine Oral Contrast    Result Date: 4/29/2024  EXAMINATION: CT ABDOMEN PELVIS WITH IV CONTRAST CLINICAL HISTORY: Left upper quadrant abdominal pain; Left upper quadrant pain TECHNIQUE: Low dose axial images, sagittal and coronal reformations were obtained from the lung bases to the pubic symphysis following the IV administration of 100 mL of Omnipaque 350 and the oral administration of 1000 mL Omnipaque 12 COMPARISON: 10/25/2023 FINDINGS: Liver, questionable mild diffuse fatty infiltration.  Otherwise unremarkable appearance., spleen, adrenal glands, unremarkable.  Pancreas mildly atrophied.  There are cholecystectomy clips.  No biliary duct dilatation.  The abdominal aorta tapers without aneurysmal dilatation Normal appearance of the appendix.  Prominent amount of stool within colon.  Diverticulosis without CT findings of acute diverticulitis.  Postoperative changes with staple line sigmoid colon.  No free intraperitoneal air or fluid Renal enhancement is symmetrical and there is no hydronephrosis.  Multiple renal cysts including 3 cm left renal cyst, 13 mm and 11 mm left renal cyst and smaller hypodensities not all fully characterize but also suggesting most likely representing cysts.  Urinary bladder is moderately distended at time of the exam and as visualized is  unremarkable appearance Prostate gland measures 4.9 cm transversely Mild fat stranding periumbilical in tiny periumbilical/umbilical hernia appearance of which is not significantly changed.  Osseous degenerative changes without obvious aggressive appearing osseous lesion.  There is bilateral spondylolysis of L5 and minimal grade 1 spondylolisthesis L5-S1 and retrolisthesis L4-5     No acute findings.  Findings as detailed above including prior cholecystectomy.  Postoperative changes with staple line sigmoid colon.  Diverticulosis without CT findings of acute diverticulitis.  Renal cysts.  Enlarged prostate gland.  Prominent amount of stool in segments of colon particularly transverse and descending colon Electronically signed by: Radha Bravo MD Date:    04/29/2024 Time:    13:53         1508 p.m. 04/29/2024-notify patient of findings.  Ordered Bentyl based off of Dr Lake previous recommendation.  GoLYTELY also ordered.  Patient was instructed not to begin Bentyl until he has had a large bowel movement.  GI referral also placed.  Patient requesting repeat albendazole.

## 2024-04-29 NOTE — PROCEDURES
Nail Removal    Date/Time: 4/29/2024 10:20 AM    Performed by: Amos Petit FNP  Authorized by: Amos Petit FNP    Consent Done?:  Yes (Verbal)  Time out: Immediately prior to the procedure a time out was called    Location:     Location:  Right foot    Location detail:  Right big toe  Anesthesia:     Anesthesia:  Digital block    Local anesthetic:  Lidocaine 1% without epinephrine    Anesthetic total (ml):  4  Procedure Details:     Preparation:  Skin prepped with Betadine    Amount removed:  Partial    Side:  Medial    Wedge excision of skin of nail fold: No      Nail bed sutured?: No      Nail matrix removed:  Partial    Matrix removal method: Silver nitrate.    Removed nail replaced and anchored: No      Dressing applied:  Antibiotic ointment and dressing applied    Patient tolerance:  Patient tolerated the procedure well with no immediate complications

## 2024-05-07 ENCOUNTER — OFFICE VISIT (OUTPATIENT)
Dept: GASTROENTEROLOGY | Facility: CLINIC | Age: 49
End: 2024-05-07
Payer: COMMERCIAL

## 2024-05-07 VITALS
DIASTOLIC BLOOD PRESSURE: 92 MMHG | HEIGHT: 71 IN | HEART RATE: 67 BPM | BODY MASS INDEX: 34.35 KG/M2 | WEIGHT: 245.38 LBS | SYSTOLIC BLOOD PRESSURE: 141 MMHG

## 2024-05-07 DIAGNOSIS — R19.7 DIARRHEA, UNSPECIFIED TYPE: Primary | ICD-10-CM

## 2024-05-07 PROCEDURE — 99203 OFFICE O/P NEW LOW 30 MIN: CPT | Mod: S$GLB,,,

## 2024-05-07 PROCEDURE — 3008F BODY MASS INDEX DOCD: CPT | Mod: CPTII,S$GLB,,

## 2024-05-07 PROCEDURE — 3080F DIAST BP >= 90 MM HG: CPT | Mod: CPTII,S$GLB,,

## 2024-05-07 PROCEDURE — 3077F SYST BP >= 140 MM HG: CPT | Mod: CPTII,S$GLB,,

## 2024-05-07 PROCEDURE — 1159F MED LIST DOCD IN RCRD: CPT | Mod: CPTII,S$GLB,,

## 2024-05-07 PROCEDURE — 99999 PR PBB SHADOW E&M-EST. PATIENT-LVL III: CPT | Mod: PBBFAC,,,

## 2024-05-07 NOTE — PROGRESS NOTES
"Subjective:       Patient ID: Shady Snyder is a 48 y.o. male Body mass index is 34.22 kg/m².    Chief Complaint: Abdominal Pain    This patient is new to me.  Referring Provider: Amos Petit for abdominal pain.     NP did not get to examine or fully discuss plan with patient. Plan was to order stool tests to identify and rule out infection/parasitic infection and come up with a treatment plan and patient stated "I am not doing this again. I'm not going through this again." Patient got up and walked out of visit and as he left he stated "I already know what it is. Zbigniew identified it. I'm not doing this again. Thanks anyway." Patient declined any and all testing/treatment this NP was going to offer.     During short time of visit - patient stated he had a parasite "flushed out of him by a GI in [place]" which he showed blurry images of on his phone. Unable to identify anything in the pictures.      Symptoms started a year - pain, had an upper and lower with Dr Gonzalez,     12/2023 EGD/Colonoscopy  Findings:       The terminal ileum appeared normal.        A 3 mm polyp was found in the transverse colon. The polyp was        sessile. The polyp was removed with a cold snare. Resection and        retrieval were complete.        Scattered small-mouthed diverticula were found in the descending        colon.        There was evidence of a prior end-to-side colo-rectal anastomosis in        the recto-sigmoid colon. This was patent and was characterized by        healthy appearing mucosa. The anastomosis was traversed.        Non-bleeding internal hemorrhoids were found during retroflexion.        The hemorrhoids were small.     indings:       Mucosal changes including ringed esophagus were found in the lower        third of the esophagus with slight prominent ring at 39cm @ GEJ. 2        cm hiatal hernia from 39-41cm. Biopsies were obtained from the        proximal and distal esophagus with cold forceps for " histology of        suspected eosinophilic esophagitis. A guidewire was placed and the        scope was withdrawn. Dilation was performed with a Savary dilator        with mild resistance at 54 Fr. The dilation site was examined        following endoscope reinsertion and showed mild mucosal disruption.        The stomach was normal. Biopsies were taken with a cold forceps for        Helicobacter pylori testing.        The examined duodenum was normal. Biopsies for histology were taken        with a cold forceps for evaluation of celiac disease.       Abdominal Pain  Associated symptoms include diarrhea.     Review of Systems   Constitutional:  Positive for appetite change and chills.   Gastrointestinal:  Positive for abdominal pain and diarrhea.       No LMP for male patient.  Past Medical History:   Diagnosis Date    Diverticulitis     GERD (gastroesophageal reflux disease)     HTN (hypertension)     Kidney stone     Sleep apnea      Past Surgical History:   Procedure Laterality Date    CHOLECYSTECTOMY      COLON SURGERY N/A 02/22/2021    12 inches of colon removed    COLONOSCOPY N/A 12/19/2023    Procedure: COLONOSCOPY;  Surgeon: Ervin Kirkpatrick III, MD;  Location: [facility];  Service: Endoscopy;  Laterality: N/A;    ESOPHAGOGASTRODUODENOSCOPY N/A 12/19/2023    Procedure: EGD (ESOPHAGOGASTRODUODENOSCOPY);  Surgeon: Ervin Kirkpatrick III, MD;  Location: [facility];  Service: Endoscopy;  Laterality: N/A;    FRACTURE SURGERY      rt foot surgery      VASECTOMY       Family History   Problem Relation Name Age of Onset    Cancer Maternal Grandmother      Cancer Maternal Grandfather      Heart disease Paternal Grandfather       Social History     Tobacco Use    Smoking status: Never    Smokeless tobacco: Never   Substance Use Topics    Alcohol use: Not Currently     Alcohol/week: 3.0 standard drinks of alcohol     Types: 2 Cans of beer, 1 Standard drinks or equivalent per week     Comment: Socially    Drug use:  Yes     Types: Marijuana     Comment: daily for pain-can't take pain pills     Wt Readings from Last 10 Encounters:   05/07/24 111.3 kg (245 lb 6 oz)   04/29/24 108.9 kg (240 lb)   03/11/24 116.1 kg (256 lb)   01/17/24 119.7 kg (264 lb)   12/14/23 117 kg (258 lb)   11/27/23 121.1 kg (267 lb)   11/07/23 121.6 kg (268 lb)   10/18/23 123.8 kg (273 lb)   09/22/23 123.8 kg (273 lb)   06/22/23 121.1 kg (267 lb)     Lab Results   Component Value Date    WBC 7.59 12/14/2023    HGB 14.2 12/14/2023    HCT 43.8 12/14/2023    MCV 91 12/14/2023     12/14/2023     CMP  Sodium   Date Value Ref Range Status   12/14/2023 137 136 - 145 mmol/L Final     Potassium   Date Value Ref Range Status   12/14/2023 4.2 3.5 - 5.1 mmol/L Final     Chloride   Date Value Ref Range Status   12/14/2023 104 95 - 110 mmol/L Final     CO2   Date Value Ref Range Status   12/14/2023 26 23 - 29 mmol/L Final     Glucose   Date Value Ref Range Status   12/14/2023 125 (H) 70 - 110 mg/dL Final     BUN   Date Value Ref Range Status   12/14/2023 10 6 - 20 mg/dL Final     Creatinine   Date Value Ref Range Status   12/14/2023 1.1 0.5 - 1.4 mg/dL Final   04/04/2012 1.0 0.2 - 1.4 mg/dl Final     Calcium   Date Value Ref Range Status   12/14/2023 9.4 8.7 - 10.5 mg/dL Final   04/04/2012 9.5 8.6 - 10.2 mg/dl Final     Total Protein   Date Value Ref Range Status   10/11/2023 6.6 6.1 - 8.1 g/dL Final     Albumin   Date Value Ref Range Status   10/11/2023 3.9 3.6 - 5.1 g/dL Final     Total Bilirubin   Date Value Ref Range Status   10/11/2023 0.5 0.2 - 1.2 mg/dL Final     Alkaline Phosphatase   Date Value Ref Range Status   08/28/2020 73 55 - 135 U/L Final   04/04/2012 79 23 - 119 UNIT/L Final     AST   Date Value Ref Range Status   10/11/2023 14 10 - 35 U/L Final   04/04/2012 15 10 - 34 UNIT/L Final     ALT   Date Value Ref Range Status   10/11/2023 17 9 - 46 U/L Final     Anion Gap   Date Value Ref Range Status   12/14/2023 7 (L) 8 - 16 mmol/L Final   04/04/2012  "10 5 - 15 meq/L Final     eGFR if    Date Value Ref Range Status   08/30/2020 >60.0 >60 mL/min/1.73 m^2 Final     eGFR if non    Date Value Ref Range Status   08/30/2020 >60.0 >60 mL/min/1.73 m^2 Final     Comment:     Calculation used to obtain the estimated glomerular filtration  rate (eGFR) is the CKD-EPI equation.        No results found for: "AMYLASE"  Lab Results   Component Value Date    LIPASE 35 02/23/2012     No results found for: "LIPASERES"  Lab Results   Component Value Date    TSH 1.64 10/11/2023       Reviewed prior medical records including radiology report of none d/t patient walking out of visit & endoscopy history (see surgical history).    Objective:      Physical Exam  Vitals (Unable to do physical exam as patient walked out of visit) reviewed.   Psychiatric:         Mood and Affect: Affect is angry.         Behavior: Behavior is uncooperative and agitated.         Thought Content: Thought content is delusional.      Comments: Pt refused any further testing or treatment and walked out of visit         Assessment:       1. Diarrhea, unspecified type        Plan:       Diarrhea, unspecified type  Attempted to order stool studies for patient but patient walked out of visit and refused. Orders placed incase patient decides at later time to submit stool sample for testing.  -     H. pylori antigen, stool; Future; Expected date: 05/07/2024  -     Pancreatic elastase, fecal; Future; Expected date: 05/07/2024  -     Fecal fat, qualitative; Future; Expected date: 05/07/2024  -     Rotavirus antigen, stool; Future; Expected date: 05/07/2024  -     Adenovirus Molecular Detection, PCR, Non-Blood Stool; Future; Expected date: 05/07/2024  -     Giardia / Cryptosporidum, EIA; Future; Expected date: 05/07/2024  -     Stool Exam-Ova,Cysts,Parasites; Future; Expected date: 05/07/2024  -     Clostridium difficile EIA; Future; Expected date: 05/07/2024  -     Stool culture; Future; " Expected date: 05/07/2024      No follow-ups on file.      If no improvement in symptoms or symptoms worsen, call/follow-up at clinic or go to ER.       CRISTAL Keys, JOHNNY    Encounter includes face to face time and non-face to face time preparing to see the patient (eg, review of tests), obtaining and/or reviewing separately obtained history, documenting clinical information in the electronic or other health record, independently interpreting results (not separately reported) and communicating results to the patient/family/caregiver, or care coordination (not separately reported).     A dictation software program was used for this note. Please expect some simple typographical errors in this note.

## 2024-12-19 ENCOUNTER — OFFICE VISIT (OUTPATIENT)
Dept: FAMILY MEDICINE | Facility: CLINIC | Age: 49
End: 2024-12-19
Payer: COMMERCIAL

## 2024-12-19 ENCOUNTER — LAB VISIT (OUTPATIENT)
Dept: LAB | Facility: HOSPITAL | Age: 49
End: 2024-12-19
Attending: FAMILY MEDICINE
Payer: COMMERCIAL

## 2024-12-19 VITALS
OXYGEN SATURATION: 97 % | SYSTOLIC BLOOD PRESSURE: 108 MMHG | WEIGHT: 251.63 LBS | BODY MASS INDEX: 35.23 KG/M2 | HEIGHT: 71 IN | DIASTOLIC BLOOD PRESSURE: 86 MMHG | RESPIRATION RATE: 14 BRPM | HEART RATE: 69 BPM

## 2024-12-19 DIAGNOSIS — Z13.29 THYROID DISORDER SCREEN: ICD-10-CM

## 2024-12-19 DIAGNOSIS — Z13.6 ENCOUNTER FOR LIPID SCREENING FOR CARDIOVASCULAR DISEASE: ICD-10-CM

## 2024-12-19 DIAGNOSIS — Z13.1 DIABETES MELLITUS SCREENING: ICD-10-CM

## 2024-12-19 DIAGNOSIS — Z76.89 ENCOUNTER TO ESTABLISH CARE: ICD-10-CM

## 2024-12-19 DIAGNOSIS — B82.9 PRESENCE OF PARASITES IN STOOL: ICD-10-CM

## 2024-12-19 DIAGNOSIS — Z13.220 ENCOUNTER FOR LIPID SCREENING FOR CARDIOVASCULAR DISEASE: ICD-10-CM

## 2024-12-19 DIAGNOSIS — I10 ESSENTIAL HYPERTENSION: Primary | ICD-10-CM

## 2024-12-19 LAB
25(OH)D3+25(OH)D2 SERPL-MCNC: 28 NG/ML (ref 30–96)
ALBUMIN SERPL BCP-MCNC: 3.8 G/DL (ref 3.5–5.2)
ALP SERPL-CCNC: 87 U/L (ref 40–150)
ALT SERPL W/O P-5'-P-CCNC: 62 U/L (ref 10–44)
ANION GAP SERPL CALC-SCNC: 9 MMOL/L (ref 8–16)
AST SERPL-CCNC: 34 U/L (ref 10–40)
BASOPHILS # BLD AUTO: 0.04 K/UL (ref 0–0.2)
BASOPHILS NFR BLD: 0.6 % (ref 0–1.9)
BILIRUB SERPL-MCNC: 0.6 MG/DL (ref 0.1–1)
BUN SERPL-MCNC: 12 MG/DL (ref 6–20)
CALCIUM SERPL-MCNC: 9.2 MG/DL (ref 8.7–10.5)
CHLORIDE SERPL-SCNC: 110 MMOL/L (ref 95–110)
CHOLEST SERPL-MCNC: 177 MG/DL (ref 120–199)
CHOLEST/HDLC SERPL: 4.3 {RATIO} (ref 2–5)
CO2 SERPL-SCNC: 20 MMOL/L (ref 23–29)
COMPLEXED PSA SERPL-MCNC: 1.4 NG/ML (ref 0–4)
CREAT SERPL-MCNC: 1.1 MG/DL (ref 0.5–1.4)
DIFFERENTIAL METHOD BLD: ABNORMAL
EOSINOPHIL # BLD AUTO: 0.1 K/UL (ref 0–0.5)
EOSINOPHIL NFR BLD: 2 % (ref 0–8)
ERYTHROCYTE [DISTWIDTH] IN BLOOD BY AUTOMATED COUNT: 12.7 % (ref 11.5–14.5)
EST. GFR  (NO RACE VARIABLE): >60 ML/MIN/1.73 M^2
ESTIMATED AVG GLUCOSE: 117 MG/DL (ref 68–131)
GLUCOSE SERPL-MCNC: 109 MG/DL (ref 70–110)
HBA1C MFR BLD: 5.7 % (ref 4–5.6)
HCT VFR BLD AUTO: 43.1 % (ref 40–54)
HDLC SERPL-MCNC: 41 MG/DL (ref 40–75)
HDLC SERPL: 23.2 % (ref 20–50)
HGB BLD-MCNC: 14.3 G/DL (ref 14–18)
IMM GRANULOCYTES # BLD AUTO: 0.04 K/UL (ref 0–0.04)
IMM GRANULOCYTES NFR BLD AUTO: 0.6 % (ref 0–0.5)
LDLC SERPL CALC-MCNC: 106.8 MG/DL (ref 63–159)
LYMPHOCYTES # BLD AUTO: 1.7 K/UL (ref 1–4.8)
LYMPHOCYTES NFR BLD: 25.4 % (ref 18–48)
MCH RBC QN AUTO: 29.1 PG (ref 27–31)
MCHC RBC AUTO-ENTMCNC: 33.2 G/DL (ref 32–36)
MCV RBC AUTO: 88 FL (ref 82–98)
MONOCYTES # BLD AUTO: 0.5 K/UL (ref 0.3–1)
MONOCYTES NFR BLD: 7.9 % (ref 4–15)
NEUTROPHILS # BLD AUTO: 4.2 K/UL (ref 1.8–7.7)
NEUTROPHILS NFR BLD: 63.5 % (ref 38–73)
NONHDLC SERPL-MCNC: 136 MG/DL
NRBC BLD-RTO: 0 /100 WBC
PLATELET # BLD AUTO: 186 K/UL (ref 150–450)
PMV BLD AUTO: 10.9 FL (ref 9.2–12.9)
POTASSIUM SERPL-SCNC: 4.1 MMOL/L (ref 3.5–5.1)
PROT SERPL-MCNC: 7.3 G/DL (ref 6–8.4)
RBC # BLD AUTO: 4.91 M/UL (ref 4.6–6.2)
SODIUM SERPL-SCNC: 139 MMOL/L (ref 136–145)
TRIGL SERPL-MCNC: 146 MG/DL (ref 30–150)
TSH SERPL DL<=0.005 MIU/L-ACNC: 1.08 UIU/ML (ref 0.4–4)
VIT B12 SERPL-MCNC: 331 PG/ML (ref 210–950)
WBC # BLD AUTO: 6.58 K/UL (ref 3.9–12.7)

## 2024-12-19 PROCEDURE — 80053 COMPREHEN METABOLIC PANEL: CPT | Performed by: FAMILY MEDICINE

## 2024-12-19 PROCEDURE — G2211 COMPLEX E/M VISIT ADD ON: HCPCS | Mod: S$GLB,,, | Performed by: FAMILY MEDICINE

## 2024-12-19 PROCEDURE — 80061 LIPID PANEL: CPT | Performed by: FAMILY MEDICINE

## 2024-12-19 PROCEDURE — 84153 ASSAY OF PSA TOTAL: CPT | Performed by: FAMILY MEDICINE

## 2024-12-19 PROCEDURE — 82306 VITAMIN D 25 HYDROXY: CPT | Performed by: FAMILY MEDICINE

## 2024-12-19 PROCEDURE — 99204 OFFICE O/P NEW MOD 45 MIN: CPT | Mod: S$GLB,,, | Performed by: FAMILY MEDICINE

## 2024-12-19 PROCEDURE — 3008F BODY MASS INDEX DOCD: CPT | Mod: S$GLB,,, | Performed by: FAMILY MEDICINE

## 2024-12-19 PROCEDURE — 99999 PR PBB SHADOW E&M-EST. PATIENT-LVL III: CPT | Mod: PBBFAC,,, | Performed by: FAMILY MEDICINE

## 2024-12-19 PROCEDURE — 82607 VITAMIN B-12: CPT | Performed by: FAMILY MEDICINE

## 2024-12-19 PROCEDURE — 85025 COMPLETE CBC W/AUTO DIFF WBC: CPT | Performed by: FAMILY MEDICINE

## 2024-12-19 PROCEDURE — 3074F SYST BP LT 130 MM HG: CPT | Mod: S$GLB,,, | Performed by: FAMILY MEDICINE

## 2024-12-19 PROCEDURE — 3079F DIAST BP 80-89 MM HG: CPT | Mod: S$GLB,,, | Performed by: FAMILY MEDICINE

## 2024-12-19 PROCEDURE — 84443 ASSAY THYROID STIM HORMONE: CPT | Performed by: FAMILY MEDICINE

## 2024-12-19 PROCEDURE — 83036 HEMOGLOBIN GLYCOSYLATED A1C: CPT | Performed by: FAMILY MEDICINE

## 2024-12-19 RX ORDER — ATORVASTATIN CALCIUM 40 MG/1
1 TABLET, FILM COATED ORAL DAILY
COMMUNITY
Start: 2023-04-03

## 2024-12-19 RX ORDER — NITROGLYCERIN 0.4 MG/1
TABLET SUBLINGUAL
COMMUNITY

## 2024-12-19 RX ORDER — LORAZEPAM 0.5 MG/1
0.25 TABLET ORAL
COMMUNITY
Start: 2022-04-07 | End: 2025-04-06

## 2024-12-19 NOTE — PROGRESS NOTES
Patient ID: Shady Snyder is a 49 y.o. male.    Chief Complaint: GI Problem and Establish Care    History of Present Illness    CHIEF COMPLAINT:  Shady presents today for ongoing abdominal pain and suspected parasitic infection.    HISTORY OF PRESENT ILLNESS:  He reports experiencing constant abdominal pain for 2 years, with severe episodes in the left lower quadrant causing him to lose consciousness and see white stars. He describes the pain as feeling like broken glass in the colon. He has visited the hospital approximately 12 times in the past 2 years and has consulted multiple GI specialists. He experiences alternating diarrhea and constipation. He has not traveled outside [place].    IMAGING AND TESTS:  Colonoscopy and CT have shown no significant findings.  Most recent CT results as follows:     CT Abdomen Pelvis With IV Contrast  Order: [id number]  Impression    Findings as can be seen with prostatitis and cystitis; correlation with PSA and urinalysis is advised.    Preliminary Report Dictated By: Kvng Tellez    Electronically Signed By: Rod Calderon MD 6/14/2024 2:39 AM CDT  Narrative    CLINICAL HISTORY:  abdominal pain    TECHNIQUE:  Multidetector-row CT images of the abdomen and pelvis were obtained from the xiphoid through the symphysis with IV contrast contrast only. Images obtained in early and delayed phases. :[id number];  IOHEXOL 350 MG IODINE/ML INTRAVENOUS SOLUTION:100mL This CT utilized automated exposure control and/or adjustment of the mA according to patient size and/or iterative reconstruction technique(s). RADIATION DOSE (DLP): 1678.1 (mGy.cm)      COMPARISON:Radiology reports from outside facility dated 4/9/2024 and 10/25/2023.    FINDINGS:  01. LIVER: Normal.  02. SPLEEN: Normal.  03. PANCREAS: Normal.  04. BILIARY TREE: The biliary tree is not dilated status post cholecystectomy.  05. ADRENALS: Normal.  06. KIDNEYS: No evidence of calcification, hydronephrosis or solid renal  mass. There are simple renal cysts within both kidneys.  07. LYMPHADENOPATHY/RETROPERITONEUM:The aorta is normal caliber. No extravasation. No periaortic abnormalities.No lymphadenopathy.  08. BOWEL: The patient status post low anterior resection. The anastomosis are normal. There are no bowel related abnormalities aside from scattered colonic diverticula.  09. PELVIC VISCERA: There is mild circumferential urinary bladder wall thickening with free perivesical fat stranding. The prostate is heterogeneous in attenuation.  10. PELVIC LYMPH NODES: No lymphadenopathy.  11. PERITONEUM/ABDOMINAL WALL: No collections. No free air. No hemoperitoneum.  12. SKELETAL: No evidence of displaced fracture or dislocation.  13. VISUALIZED LOWER THORAX: Clear aside from minimal dependent subsegmental atelectatic changes.  Exam End: 06/14/24 01:22    Specimen Collected: 06/14/24 01:29 Last Resulted: 06/14/24 02:39   Received From: [facility]  Result Received: 12/19/24 09:01          SURGICAL HISTORY:  History of partial colectomy with approximately one foot of colon removed.    CURRENT TREATMENT:  He has been taking various supplements and pursuing detox methods, believing he has a liver fluke infection.      ROS:  ROS as indicated in HPI.         Physical Exam    General: No acute distress. Well-developed. Well-nourished.  Eyes: EOMI. Sclerae anicteric.  HENT: Normocephalic. Atraumatic. Nares patent. Moist oral mucosa.  Cardiovascular: Regular rate. Regular rhythm. No murmurs. No rubs. No gallops. Normal S1, S2.  Respiratory: Normal respiratory effort. Clear to auscultation bilaterally. No rales. No rhonchi. No wheezing.  Musculoskeletal: No  obvious deformity.  Extremities: No lower extremity edema.  Neurological: Alert & oriented x3. No slurred speech. Normal gait.  Psychiatric: Normal mood. Normal affect. Good insight. Good judgment.  Skin: Warm. Dry. No rash.         Assessment & Plan    IMPRESSION:  - Suspect ongoing parasitic  infection, possibly liver fluke, based on patient's history and symptoms  - Consider re-evaluation for parasites, focusing on proper stool sample collection and testing  - Assess for potential colon damage due to patient's reported self-inflicted abdominal trauma  - Rule out other GI pathologies given persistent left lower quadrant pain  - Monitor for signs of peritonitis or other acute abdominal conditions    DIGESTIVE SYSTEM SYMPTOMS:  - Stool ova and parasite test ordered.  - Drop off stool sample at the lab using provided collection kit.  - Follow up with stool sample when parasites are visible.    BLOOD WORK:  - Routine labs ordered, including complete blood count with differential to check eosinophil levels.    FOLLOW-UP AND REFERRALS:  - Consider referral to a different gastroenterologist for second opinion.         Plan:          Essential hypertension  -     Microalbumin/Creatinine Ratio, Urine; Future; Expected date: 12/19/2024    Encounter to establish care  -     Microalbumin/Creatinine Ratio, Urine; Future; Expected date: 12/19/2024  -     Vitamin D; Future; Expected date: 12/19/2024  -     Vitamin B12; Future; Expected date: 12/19/2024  -     Lipid Panel; Future; Expected date: 12/19/2024  -     CBC Auto Differential; Future; Expected date: 12/19/2024  -     Comprehensive Metabolic Panel; Future; Expected date: 12/19/2024  -     Hemoglobin A1C; Future; Expected date: 12/19/2024  -     TSH; Future; Expected date: 12/19/2024  -     PSA, Screening; Future; Expected date: 12/19/2024    Thyroid disorder screen  -     TSH; Future; Expected date: 12/19/2024    Diabetes mellitus screening  -     Hemoglobin A1C; Future; Expected date: 12/19/2024    Encounter for lipid screening for cardiovascular disease  -     Lipid Panel; Future; Expected date: 12/19/2024    Presence of parasites in stool  -     Stool Exam-Ova,Cysts,Parasites    New patient: In excessive of 45 minutes total time spent for evaluation and  management services. Time included elements of the following: time spent preparing to see patient, obtaining and reviewing separately obtained history, exam, evaluation, counseling and education of patient/family member or care giver, documenting in the HMR, independently interpreting results and communication of results, coordination of care ordering medications, tests, or procedures, referral and communication to other health care professionals.      will Follow-up to review test results once all results are available, if indicated    This note was generated with the assistance of ambient listening technology. Verbal consent was obtained by the patient and accompanying visitor(s) for the recording of patient appointment to facilitate this note. I attest to having reviewed and edited the generated note for accuracy, though some syntax or spelling errors may persist. Please contact the author of this note for any clarification.

## 2024-12-20 ENCOUNTER — LAB VISIT (OUTPATIENT)
Dept: LAB | Facility: HOSPITAL | Age: 49
End: 2024-12-20
Attending: FAMILY MEDICINE
Payer: COMMERCIAL

## 2024-12-20 ENCOUNTER — PATIENT MESSAGE (OUTPATIENT)
Dept: FAMILY MEDICINE | Facility: CLINIC | Age: 49
End: 2024-12-20
Payer: COMMERCIAL

## 2024-12-20 DIAGNOSIS — E55.9 VITAMIN D DEFICIENCY: Primary | ICD-10-CM

## 2024-12-20 DIAGNOSIS — B82.9 INTESTINAL PARASITISM, UNSPECIFIED: Primary | ICD-10-CM

## 2024-12-20 PROCEDURE — 87177 OVA AND PARASITES SMEARS: CPT | Performed by: FAMILY MEDICINE

## 2024-12-20 RX ORDER — ERGOCALCIFEROL 1.25 MG/1
50000 CAPSULE ORAL
Qty: 13 CAPSULE | Refills: 3 | Status: SHIPPED | OUTPATIENT
Start: 2024-12-20

## 2024-12-26 LAB — O+P STL MICRO: NORMAL

## 2025-01-31 ENCOUNTER — OFFICE VISIT (OUTPATIENT)
Dept: URGENT CARE | Facility: CLINIC | Age: 50
End: 2025-01-31
Payer: COMMERCIAL

## 2025-01-31 VITALS
DIASTOLIC BLOOD PRESSURE: 87 MMHG | OXYGEN SATURATION: 98 % | HEIGHT: 71 IN | WEIGHT: 248.69 LBS | TEMPERATURE: 98 F | RESPIRATION RATE: 14 BRPM | BODY MASS INDEX: 34.81 KG/M2 | SYSTOLIC BLOOD PRESSURE: 146 MMHG | HEART RATE: 75 BPM

## 2025-01-31 DIAGNOSIS — D36.7 DERMOID CYST OF HEAD: ICD-10-CM

## 2025-01-31 DIAGNOSIS — L08.9 RECURRENT INFECTION OF SKIN: Primary | ICD-10-CM

## 2025-01-31 PROCEDURE — 99213 OFFICE O/P EST LOW 20 MIN: CPT | Mod: S$GLB,,, | Performed by: STUDENT IN AN ORGANIZED HEALTH CARE EDUCATION/TRAINING PROGRAM

## 2025-01-31 RX ORDER — DOXYCYCLINE HYCLATE 100 MG
100 TABLET ORAL 2 TIMES DAILY
Qty: 20 TABLET | Refills: 0 | Status: SHIPPED | OUTPATIENT
Start: 2025-01-31 | End: 2025-02-10

## 2025-01-31 NOTE — PROGRESS NOTES
"Subjective:      Patient ID: Shady Snyder is a 49 y.o. male.    Vitals:  height is 5' 11" (1.803 m) and weight is 112.8 kg (248 lb 11.2 oz). His oral temperature is 98.2 °F (36.8 °C). His blood pressure is 146/87 (abnormal) and his pulse is 75. His respiration is 14 and oxygen saturation is 98%.     Chief Complaint: Abscess    Patient is a 49-year-old male with a past medical history of hypertension, diverticulitis, GERD, sleep apnea, nephrolithiasis who presents to clinic for evaluation of abscess.  Patient reports he has about 3 cyst on his abdomen and 1 to the back of the head.  Patient states has had a similar cyst on the right side of the rear head removed many years ago.  Patient states starting to have something similar on the left side of the head.  Patient states this has been present for multiple months.  Patient states does not cause any complications.  Patient states that he would like to have all of his cyst removed, cut out or drained.  Patient states that he has been attempting to pop and drain many of his cyst himself at home.    Abscess  Chronicity:  NewProgression Since Onset: unchanged  Location:  Head/neck  Associated Symptoms: no fever, no chills  Characteristics: painful and swelling    Relieved by:  Nothing  Worsened by:  Nothing      Constitution: Negative. Negative for chills, sweating, fatigue and fever.   HENT: Negative.  Negative for ear pain, congestion and sore throat.    Neck: neck negative.   Cardiovascular: Negative.  Negative for chest pain and palpitations.   Eyes: Negative.    Respiratory: Negative.  Negative for chest tightness, cough and shortness of breath.    Gastrointestinal: Negative.  Negative for abdominal pain, nausea, vomiting and diarrhea.   Endocrine: negative.   Genitourinary: Negative.    Musculoskeletal: Negative.  Negative for muscle ache.   Skin:  Positive for abscess. Negative for erythema.   Allergic/Immunologic: Negative.    Neurological: Negative.  " Negative for dizziness, light-headedness, passing out, headaches, disorientation and altered mental status.   Hematologic/Lymphatic: Negative.    Psychiatric/Behavioral: Negative.  Negative for altered mental status, disorientation and confusion.       Objective:     Physical Exam   Constitutional: He is oriented to person, place, and time. He appears well-developed. He is cooperative.  Non-toxic appearance. He does not appear ill. No distress.   HENT:   Head: Normocephalic and atraumatic.   Ears:   Right Ear: Hearing and external ear normal.   Left Ear: Hearing and external ear normal.   Nose: Nose normal. No mucosal edema or nasal deformity. No epistaxis. Right sinus exhibits no maxillary sinus tenderness and no frontal sinus tenderness. Left sinus exhibits no maxillary sinus tenderness and no frontal sinus tenderness.   Mouth/Throat: Uvula is midline, oropharynx is clear and moist and mucous membranes are normal. Mucous membranes are moist. No trismus in the jaw. Normal dentition. No uvula swelling. Oropharynx is clear.   Eyes: Conjunctivae and lids are normal. Pupils are equal, round, and reactive to light. Right eye exhibits no discharge. Left eye exhibits no discharge. No scleral icterus.   Neck: Trachea normal and phonation normal. Neck supple. No neck rigidity present.   Cardiovascular: Normal rate, regular rhythm, normal heart sounds and normal pulses.   Pulmonary/Chest: Effort normal and breath sounds normal. No respiratory distress. He has no wheezes. He has no rhonchi. He has no rales.   Abdominal: Normal appearance. He exhibits no distension. Soft.   Musculoskeletal: Normal range of motion.         General: Normal range of motion.      Cervical back: He exhibits no tenderness.   Lymphadenopathy:     He has no cervical adenopathy.   Neurological: He is alert and oriented to person, place, and time. He exhibits normal muscle tone.   Skin: Skin is warm, dry, intact, not diaphoretic, not pale and no rash.  Capillary refill takes less than 2 seconds. No bruising and No erythema        Psychiatric: His speech is normal and behavior is normal. Judgment and thought content normal.   Nursing note and vitals reviewed.      Assessment:     1. Recurrent infection of skin    2. Dermoid cyst of head        Plan:       Recurrent infection of skin  -     Ambulatory referral/consult to Dermatology    Dermoid cyst of head  -     Ambulatory referral/consult to Dermatology    Other orders  -     doxycycline (VIBRA-TABS) 100 MG tablet; Take 1 tablet (100 mg total) by mouth 2 (two) times daily. for 10 days  Dispense: 20 tablet; Refill: 0                Take medications as prescribed.  Assure drinking full glass of water with doxycycline.  Limit sun exposure while on doxycycline.  Assure not to lay down for 60 minutes after taking doxycycline.  Warm compresses as directed.    Referral placed for Dermatology for likely cyst removal.  Patient states it was also told by another doctor that he had a few cyst on his abdomen that as needed to be removed via a dermatologist.  Patient in agreement to follow-up further evaluation and treatment with possible cyst removal.    Tylenol per package instructions for any pain or fever.    Follow-up with PCP in 1-2 days.    Return to clinic as needed.    To ED for any new or acutely worsening symptoms.    Patient in agreement with plan of care.    DISCLAIMER: Please note that my documentation in this Electronic Healthcare Record was produced using speech recognition software and therefore may contain errors related to that software system.These could include grammar, punctuation and spelling errors or the inclusion/exclusion of phrases that were not intended. Garbled syntax, mangled pronouns, and other bizarre constructions may be attributed to that software system.

## 2025-02-06 ENCOUNTER — TELEPHONE (OUTPATIENT)
Dept: DERMATOLOGY | Facility: CLINIC | Age: 50
End: 2025-02-06
Payer: COMMERCIAL

## 2025-02-06 NOTE — TELEPHONE ENCOUNTER
----- Message from Harris sent at 2/6/2025  4:36 PM CST -----  Contact: Pt  Type:  Sooner Apoointment Request    Caller is requesting a sooner appointment.  Caller declined first available appointment listed below.  Caller will not accept being placed on the waitlist and is requesting a message be sent to doctor.  Name of Caller: pt   When is the first available appointment? nurse  Symptoms: Recurrent infection of skin [L08.9] Dermoid cyst of head [D36.7]  Would the patient rather a call back or a response via [facility]? phone  Best Call Back Number:[phone]   Additional Information:  referred by Isaac Espinoza

## 2025-02-07 ENCOUNTER — HOSPITAL ENCOUNTER (EMERGENCY)
Facility: HOSPITAL | Age: 50
Discharge: HOME | End: 2025-02-07
Attending: EMERGENCY MEDICINE
Payer: COMMERCIAL

## 2025-02-07 VITALS
HEIGHT: 71 IN | DIASTOLIC BLOOD PRESSURE: 82 MMHG | RESPIRATION RATE: 15 BRPM | SYSTOLIC BLOOD PRESSURE: 140 MMHG | WEIGHT: 249 LBS | HEART RATE: 88 BPM | TEMPERATURE: 98 F | BODY MASS INDEX: 34.86 KG/M2 | OXYGEN SATURATION: 99 %

## 2025-02-07 DIAGNOSIS — K59.01 CONSTIPATION BY DELAYED COLONIC TRANSIT: ICD-10-CM

## 2025-02-07 DIAGNOSIS — G89.28 CHRONIC POSTOPERATIVE PAIN: Primary | ICD-10-CM

## 2025-02-07 LAB
ALBUMIN SERPL BCP-MCNC: 4.1 G/DL (ref 3.5–5.2)
ALP SERPL-CCNC: 107 U/L (ref 40–150)
ALT SERPL W/O P-5'-P-CCNC: 19 U/L (ref 10–44)
AMYLASE SERPL-CCNC: 48 U/L (ref 20–110)
ANION GAP SERPL CALC-SCNC: 12 MMOL/L (ref 8–16)
AST SERPL-CCNC: 16 U/L (ref 10–40)
BASOPHILS # BLD AUTO: 0.05 K/UL (ref 0–0.2)
BASOPHILS NFR BLD: 0.5 % (ref 0–1.9)
BILIRUB SERPL-MCNC: 0.9 MG/DL (ref 0.1–1)
BILIRUB UR QL STRIP: NEGATIVE
BUN SERPL-MCNC: 14 MG/DL (ref 6–20)
CALCIUM SERPL-MCNC: 9.5 MG/DL (ref 8.7–10.5)
CHLORIDE SERPL-SCNC: 107 MMOL/L (ref 95–110)
CLARITY UR: CLEAR
CO2 SERPL-SCNC: 19 MMOL/L (ref 23–29)
COLOR UR: YELLOW
CREAT SERPL-MCNC: 1.2 MG/DL (ref 0.5–1.4)
DIFFERENTIAL METHOD BLD: ABNORMAL
EOSINOPHIL # BLD AUTO: 0 K/UL (ref 0–0.5)
EOSINOPHIL NFR BLD: 0.3 % (ref 0–8)
ERYTHROCYTE [DISTWIDTH] IN BLOOD BY AUTOMATED COUNT: 13 % (ref 11.5–14.5)
EST. GFR  (NO RACE VARIABLE): >60 ML/MIN/1.73 M^2
GLUCOSE SERPL-MCNC: 120 MG/DL (ref 70–110)
GLUCOSE UR QL STRIP: NEGATIVE
HCT VFR BLD AUTO: 46 % (ref 40–54)
HGB BLD-MCNC: 15.5 G/DL (ref 14–18)
HGB UR QL STRIP: ABNORMAL
IMM GRANULOCYTES # BLD AUTO: 0.05 K/UL (ref 0–0.04)
IMM GRANULOCYTES NFR BLD AUTO: 0.5 % (ref 0–0.5)
KETONES UR QL STRIP: ABNORMAL
LEUKOCYTE ESTERASE UR QL STRIP: NEGATIVE
LIPASE SERPL-CCNC: 17 U/L (ref 4–60)
LYMPHOCYTES # BLD AUTO: 2.1 K/UL (ref 1–4.8)
LYMPHOCYTES NFR BLD: 22.2 % (ref 18–48)
MCH RBC QN AUTO: 29.1 PG (ref 27–31)
MCHC RBC AUTO-ENTMCNC: 33.7 G/DL (ref 32–36)
MCV RBC AUTO: 87 FL (ref 82–98)
MONOCYTES # BLD AUTO: 0.8 K/UL (ref 0.3–1)
MONOCYTES NFR BLD: 8.1 % (ref 4–15)
NEUTROPHILS # BLD AUTO: 6.4 K/UL (ref 1.8–7.7)
NEUTROPHILS NFR BLD: 68.4 % (ref 38–73)
NITRITE UR QL STRIP: NEGATIVE
NRBC BLD-RTO: 0 /100 WBC
PH UR STRIP: 8 [PH] (ref 5–8)
PLATELET # BLD AUTO: 291 K/UL (ref 150–450)
PMV BLD AUTO: 10.6 FL (ref 9.2–12.9)
POTASSIUM SERPL-SCNC: 4.1 MMOL/L (ref 3.5–5.1)
PROT SERPL-MCNC: 7.6 G/DL (ref 6–8.4)
PROT UR QL STRIP: NEGATIVE
RBC # BLD AUTO: 5.32 M/UL (ref 4.6–6.2)
SODIUM SERPL-SCNC: 138 MMOL/L (ref 136–145)
SP GR UR STRIP: 1.01 (ref 1–1.03)
URN SPEC COLLECT METH UR: ABNORMAL
UROBILINOGEN UR STRIP-ACNC: NEGATIVE EU/DL
WBC # BLD AUTO: 9.31 K/UL (ref 3.9–12.7)

## 2025-02-07 PROCEDURE — 83690 ASSAY OF LIPASE: CPT | Performed by: EMERGENCY MEDICINE

## 2025-02-07 PROCEDURE — 74177 CT ABD & PELVIS W/CONTRAST: CPT | Mod: TC

## 2025-02-07 PROCEDURE — 25000003 PHARM REV CODE 250: Performed by: EMERGENCY MEDICINE

## 2025-02-07 PROCEDURE — 96376 TX/PRO/DX INJ SAME DRUG ADON: CPT

## 2025-02-07 PROCEDURE — 96375 TX/PRO/DX INJ NEW DRUG ADDON: CPT

## 2025-02-07 PROCEDURE — 99285 EMERGENCY DEPT VISIT HI MDM: CPT | Mod: 25

## 2025-02-07 PROCEDURE — 72128 CT CHEST SPINE W/O DYE: CPT | Mod: TC

## 2025-02-07 PROCEDURE — 74177 CT ABD & PELVIS W/CONTRAST: CPT | Mod: 26,,, | Performed by: RADIOLOGY

## 2025-02-07 PROCEDURE — 80053 COMPREHEN METABOLIC PANEL: CPT | Performed by: EMERGENCY MEDICINE

## 2025-02-07 PROCEDURE — 85025 COMPLETE CBC W/AUTO DIFF WBC: CPT | Performed by: EMERGENCY MEDICINE

## 2025-02-07 PROCEDURE — 81003 URINALYSIS AUTO W/O SCOPE: CPT | Performed by: EMERGENCY MEDICINE

## 2025-02-07 PROCEDURE — 63600175 PHARM REV CODE 636 W HCPCS: Mod: JZ,TB | Performed by: EMERGENCY MEDICINE

## 2025-02-07 PROCEDURE — 96374 THER/PROPH/DIAG INJ IV PUSH: CPT

## 2025-02-07 PROCEDURE — 82150 ASSAY OF AMYLASE: CPT | Performed by: EMERGENCY MEDICINE

## 2025-02-07 PROCEDURE — 25500020 PHARM REV CODE 255: Performed by: EMERGENCY MEDICINE

## 2025-02-07 PROCEDURE — 51798 US URINE CAPACITY MEASURE: CPT

## 2025-02-07 RX ORDER — PSYLLIUM HUSK 0.4 G
1 CAPSULE ORAL 3 TIMES DAILY PRN
Qty: 30 CAPSULE | Refills: 0 | Status: SHIPPED | OUTPATIENT
Start: 2025-02-07 | End: 2025-02-17

## 2025-02-07 RX ORDER — HYDROMORPHONE HYDROCHLORIDE 1 MG/ML
1 INJECTION, SOLUTION INTRAMUSCULAR; INTRAVENOUS; SUBCUTANEOUS
Status: COMPLETED | OUTPATIENT
Start: 2025-02-07 | End: 2025-02-07

## 2025-02-07 RX ORDER — MORPHINE SULFATE 2 MG/ML
4 INJECTION, SOLUTION INTRAMUSCULAR; INTRAVENOUS
Status: COMPLETED | OUTPATIENT
Start: 2025-02-07 | End: 2025-02-07

## 2025-02-07 RX ORDER — ONDANSETRON HYDROCHLORIDE 2 MG/ML
8 INJECTION, SOLUTION INTRAVENOUS
Status: COMPLETED | OUTPATIENT
Start: 2025-02-07 | End: 2025-02-07

## 2025-02-07 RX ORDER — LIDOCAINE 50 MG/G
1 PATCH TOPICAL
Status: DISCONTINUED | OUTPATIENT
Start: 2025-02-07 | End: 2025-02-07 | Stop reason: HOSPADM

## 2025-02-07 RX ORDER — POLYETHYLENE GLYCOL 3350 17 G/17G
17 POWDER, FOR SOLUTION ORAL DAILY
Qty: 510 G | Refills: 0 | Status: SHIPPED | OUTPATIENT
Start: 2025-02-07 | End: 2025-03-09

## 2025-02-07 RX ORDER — KETOROLAC TROMETHAMINE 30 MG/ML
15 INJECTION, SOLUTION INTRAMUSCULAR; INTRAVENOUS
Status: COMPLETED | OUTPATIENT
Start: 2025-02-07 | End: 2025-02-07

## 2025-02-07 RX ADMIN — KETOROLAC TROMETHAMINE 15 MG: 30 INJECTION, SOLUTION INTRAMUSCULAR; INTRAVENOUS at 11:02

## 2025-02-07 RX ADMIN — MORPHINE SULFATE 4 MG: 2 INJECTION, SOLUTION INTRAMUSCULAR; INTRAVENOUS at 12:02

## 2025-02-07 RX ADMIN — HYDROMORPHONE HYDROCHLORIDE 1 MG: 1 INJECTION, SOLUTION INTRAMUSCULAR; INTRAVENOUS; SUBCUTANEOUS at 10:02

## 2025-02-07 RX ADMIN — IOHEXOL 100 ML: 350 INJECTION, SOLUTION INTRAVENOUS at 12:02

## 2025-02-07 RX ADMIN — LIDOCAINE 1 PATCH: 700 PATCH TOPICAL at 12:02

## 2025-02-07 RX ADMIN — ONDANSETRON 8 MG: 2 INJECTION INTRAMUSCULAR; INTRAVENOUS at 10:02

## 2025-02-07 RX ADMIN — MORPHINE SULFATE 4 MG: 2 INJECTION, SOLUTION INTRAMUSCULAR; INTRAVENOUS at 11:02

## 2025-02-07 NOTE — ED PROVIDER NOTES
History     Chief Complaint   Patient presents with    Abdominal Pain     PT c/o left sided pain, was seen by his doctor yesterday and told everything was fine. PT stated he was having issues urinating a few days ago and was incontinent this morning while in  the shower.     HPI:  Shady Snyder is a 49 y.o. male with PMH as below who presents to the [facility] emergency department for evaluation of 2 years of intermittent, severe LLQ pain that feels burning/fiery, radiating from the flank. He's had very extensive workup including negative scans, c-scope, EGD. Patient had a colon resection on 2/22/21 and the following year started developing pain and difficulty with stooling. He did not have an ostomy.     PCP: Zara Reardon MD    Review of patient's allergies indicates:   Allergen Reactions    Celebrex [celecoxib]      Jaundice      Past Medical History:   Diagnosis Date    Diverticulitis     GERD (gastroesophageal reflux disease)     HTN (hypertension)     Kidney stone     Sleep apnea      Past Surgical History:   Procedure Laterality Date    CHOLECYSTECTOMY      COLON SURGERY N/A 02/22/2021    12 inches of colon removed    COLONOSCOPY N/A 12/19/2023    Procedure: COLONOSCOPY;  Surgeon: Ervin Kirkpatrick III, MD;  Location: [facility];  Service: Endoscopy;  Laterality: N/A;    ESOPHAGOGASTRODUODENOSCOPY N/A 12/19/2023    Procedure: EGD (ESOPHAGOGASTRODUODENOSCOPY);  Surgeon: Ervin Kirkpatrick III, MD;  Location: [facility];  Service: Endoscopy;  Laterality: N/A;    FRACTURE SURGERY      rt foot surgery      VASECTOMY         Family History   Problem Relation Name Age of Onset    Cancer Maternal Grandmother      Cancer Maternal Grandfather      Heart disease Paternal Grandfather       Social History     Tobacco Use    Smoking status: Never    Smokeless tobacco: Never   Substance and Sexual Activity    Alcohol use: Not Currently     Alcohol/week: 3.0 standard drinks of alcohol     Types: 2 Cans  "of beer, 1 Standard drinks or equivalent per week     Comment: Socially    Drug use: Yes     Types: Marijuana     Comment: daily for pain-can't take pain pills    Sexual activity: Not on file      Review of Systems     Review of Systems   Constitutional: Negative.  Negative for fever.   HENT: Negative.     Eyes: Negative.    Respiratory: Negative.     Cardiovascular: Negative.    Gastrointestinal: Negative.  Negative for diarrhea and vomiting.   Endocrine: Negative.    Genitourinary:  Positive for flank pain.   Musculoskeletal:  Negative for back pain.   Skin: Negative.    Allergic/Immunologic: Negative.    Neurological: Negative.    Hematological: Negative.    Psychiatric/Behavioral: Negative.     All other systems reviewed and are negative.       Physical Exam     Initial Vitals [02/07/25 0959]   BP Pulse Resp Temp SpO2   127/81 101 (!) 21 98.1 °F (36.7 °C) 99 %      MAP       --          Nursing notes and vital signs reviewed.  Constitutional: Patient is in moderate to severe distress.   Head: Normocephalic. Atraumatic.   Eyes:  Conjunctivae are not pale. No scleral icterus.   ENT: Mucous membranes moist.   Neck: Supple.   Cardiovascular: Regular rate. Regular rhythm.   Pulmonary: No respiratory distress.   Abdominal: Soft. Non-distended. LLQ tenderness without rebound tenderness or guarding.   Musculoskeletal: Moves all extremities. No obvious deformities. Left paralumbar tenderness. No CVAT.   Skin: Warm and dry.   Neurological:  Alert, awake, and appropriate. Normal speech. No acute lateralizing neurologic deficits appreciated.   Psychiatric: Normal affect.       ED Course   Procedures  Vitals:    02/07/25 0959 02/07/25 1031 02/07/25 1040 02/07/25 1102   BP: 127/81  123/82 123/82   Pulse: 101  85 77   Resp: (!) 21 (!) 21 (!) 21 20   Temp: 98.1 °F (36.7 °C)      TempSrc: Oral      SpO2: 99%  98%    Weight: 112.9 kg (249 lb)      Height: 5' 11" (1.803 m)       02/07/25 1103 02/07/25 1119 02/07/25 1131 02/07/25 " 1156   BP: 123/82 130/71  136/75   Pulse: 77 81  95   Resp: (!) 21 20 (!) 23 (!) 22   Temp:       TempSrc:       SpO2: 97% 97%  100%   Weight:       Height:        02/07/25 1203 02/07/25 1215   BP:  (!) 140/69   Pulse:  80   Resp: (!) 22 19   Temp:     TempSrc:     SpO2:  100%   Weight:     Height:       Lab Results Interpreted as Abnormal:  Labs Reviewed   CBC W/ AUTO DIFFERENTIAL - Abnormal       Result Value    WBC 9.31      RBC 5.32      Hemoglobin 15.5      Hematocrit 46.0      MCV 87      MCH 29.1      MCHC 33.7      RDW 13.0      Platelets 291      MPV 10.6      Immature Granulocytes 0.5      Gran # (ANC) 6.4      Immature Grans (Abs) 0.05 (*)     Lymph # 2.1      Mono # 0.8      Eos # 0.0      Baso # 0.05      nRBC 0      Gran % 68.4      Lymph % 22.2      Mono % 8.1      Eosinophil % 0.3      Basophil % 0.5      Differential Method Automated     COMPREHENSIVE METABOLIC PANEL - Abnormal    Sodium 138      Potassium 4.1      Chloride 107      CO2 19 (*)     Glucose 120 (*)     BUN 14      Creatinine 1.2      Calcium 9.5      Total Protein 7.6      Albumin 4.1      Total Bilirubin 0.9      Alkaline Phosphatase 107      AST 16      ALT 19      eGFR >60.0      Anion Gap 12     URINALYSIS, REFLEX TO URINE CULTURE - Abnormal    Specimen UA Urine, Unspecified      Color, UA Yellow      Appearance, UA Clear      pH, UA 8.0      Specific Gravity, UA 1.015      Protein, UA Negative      Glucose, UA Negative      Ketones, UA Trace (*)     Bilirubin (UA) Negative      Occult Blood UA Trace (*)     Nitrite, UA Negative      Urobilinogen, UA Negative      Leukocytes, UA Negative      Narrative:     Preferred Collection Type->Urine, Clean Catch  Specimen Source->Urine   LIPASE   AMYLASE   LIPASE    Lipase 17     AMYLASE    Amylase 48        All Lab Results:  Results for orders placed or performed during the hospital encounter of 02/07/25   CBC auto differential    Collection Time: 02/07/25 10:38 AM   Result Value Ref Range     WBC 9.31 3.90 - 12.70 K/uL    RBC 5.32 4.60 - 6.20 M/uL    Hemoglobin 15.5 14.0 - 18.0 g/dL    Hematocrit 46.0 40.0 - 54.0 %    MCV 87 82 - 98 fL    MCH 29.1 27.0 - 31.0 pg    MCHC 33.7 32.0 - 36.0 g/dL    RDW 13.0 11.5 - 14.5 %    Platelets 291 150 - 450 K/uL    MPV 10.6 9.2 - 12.9 fL    Immature Granulocytes 0.5 0.0 - 0.5 %    Gran # (ANC) 6.4 1.8 - 7.7 K/uL    Immature Grans (Abs) 0.05 (H) 0.00 - 0.04 K/uL    Lymph # 2.1 1.0 - 4.8 K/uL    Mono # 0.8 0.3 - 1.0 K/uL    Eos # 0.0 0.0 - 0.5 K/uL    Baso # 0.05 0.00 - 0.20 K/uL    nRBC 0 0 /100 WBC    Gran % 68.4 38.0 - 73.0 %    Lymph % 22.2 18.0 - 48.0 %    Mono % 8.1 4.0 - 15.0 %    Eosinophil % 0.3 0.0 - 8.0 %    Basophil % 0.5 0.0 - 1.9 %    Differential Method Automated    Comprehensive metabolic panel    Collection Time: 02/07/25 10:38 AM   Result Value Ref Range    Sodium 138 136 - 145 mmol/L    Potassium 4.1 3.5 - 5.1 mmol/L    Chloride 107 95 - 110 mmol/L    CO2 19 (L) 23 - 29 mmol/L    Glucose 120 (H) 70 - 110 mg/dL    BUN 14 6 - 20 mg/dL    Creatinine 1.2 0.5 - 1.4 mg/dL    Calcium 9.5 8.7 - 10.5 mg/dL    Total Protein 7.6 6.0 - 8.4 g/dL    Albumin 4.1 3.5 - 5.2 g/dL    Total Bilirubin 0.9 0.1 - 1.0 mg/dL    Alkaline Phosphatase 107 40 - 150 U/L    AST 16 10 - 40 U/L    ALT 19 10 - 44 U/L    eGFR >60.0 >60 mL/min/1.73 m^2    Anion Gap 12 8 - 16 mmol/L   Lipase    Collection Time: 02/07/25 10:38 AM   Result Value Ref Range    Lipase 17 4 - 60 U/L   Amylase    Collection Time: 02/07/25 10:38 AM   Result Value Ref Range    Amylase 48 20 - 110 U/L   Urinalysis, Reflex to Urine Culture Urine, Clean Catch    Collection Time: 02/07/25 11:02 AM    Specimen: Urine, Clean Catch   Result Value Ref Range    Specimen UA Urine, Unspecified     Color, UA Yellow Yellow, Straw, Jasmin    Appearance, UA Clear Clear    pH, UA 8.0 5.0 - 8.0    Specific Gravity, UA 1.015 1.005 - 1.030    Protein, UA Negative Negative    Glucose, UA Negative Negative    Ketones, UA Trace (A)  Negative    Bilirubin (UA) Negative Negative    Occult Blood UA Trace (A) Negative    Nitrite, UA Negative Negative    Urobilinogen, UA Negative Negative EU/dL    Leukocytes, UA Negative Negative     Imaging Results              CT Abdomen Pelvis With IV Contrast NO Oral Contrast (Final result)  Result time 02/07/25 12:11:38      Final result by Shazia Alonzo MD (02/07/25 12:11:38)                   Impression:      No acute abnormality, and no significant interval changes.  Prior colon resection, cholecystectomy.  Colonic diverticula, renal cysts      Electronically signed by: Shazia Alonzo  Date:    02/07/2025  Time:    12:11               Narrative:    EXAMINATION:  CT ABDOMEN PELVIS WITH IV CONTRAST    CLINICAL HISTORY:  LLQ abdominal pain;    TECHNIQUE:  Low dose axial images, sagittal and coronal reformations were obtained from the lung bases to the pubic symphysis following the IV administration of 100 mL of Omnipaque 350 .  No oral contrast was administered    COMPARISON:  04/29/2024 and the report of an outside study 06/14/2024    FINDINGS:  There are no significant findings in the visualized portions of the lung bases.    The liver, spleen, adrenal glands, pancreas are normal.  There are simple cysts in the kidneys.  The gallbladder is surgically absent.    The aorta is normal in caliber.    The urinary bladder is unremarkable.  Prostate mildly prominent.    Staple line in the rectosigmoid.  There are diverticula in the colon without evidence of diverticulitis.  There is no inflammation or obstruction in the bowel.  There is a tiny fat-containing periumbilical hernia.  No ascites or adenopathy.    There is bilateral spondylolysis of L5 with slight spondylolisthesis of L5 on S1.                                       CT Thoracic Spine Without Contrast (Final result)  Result time 02/07/25 12:23:42      Final result by Josse Gongora IV, MD (02/07/25 12:23:42)                   Impression:       1. Degenerative changes are noted in the lower cervical spine partially displayed.  No significant central canal stenosis or neural foraminal stenosis is noted in the visualized thoracic spine.  No obvious fractures are noted.  MRI is more sensitive and specific for degenerative changes if necessary.  2. Renal hypodensities not well displayed on today's exam but statistically favored relate to cysts.  No worrisome changes are convincingly noted since 10/25/2023.      Electronically signed by: Josse Gongora MD  Date:    02/07/2025  Time:    12:23               Narrative:    EXAMINATION:  CT THORACIC SPINE WITHOUT CONTRAST    CLINICAL HISTORY:  Myelopathy, acute, thoracic spine;severe left flank and LLQ pain with neuropathic description, extensive negative workup;    TECHNIQUE:  Axial images were obtained through the thoracic spine with reformats created.    COMPARISON:  None    FINDINGS:  A hypodensity is noted at the lower pole of the right kidney of 10 mm better displayed on 10/25/2023 at a similar size suggestive a renal cyst.  A similar hypodensity is noted at the upper pole of the left kidney of 12 mm and at the mid left kidney of 16 mm all statistically favored relate to cysts without worrisome detrimental change but not well categorized on this exam.    Gas is noted within the esophagus as can be seen with air swallowing or reflux.    Intervertebral disc height loss is noted at the C6-C7 and C7-T1 levels.  Osseous neural foraminal narrowing may be noted at the C6-C7 level bilaterally in particular.  If necessary dedicated imaging could be performed.  Mild central canal narrowing is suspected    Thoracic vertebral body heights appear well preserved.  Thoracic vertebral body alignment appears adequate.  No obvious fracture or dislocation is noted.  Thoracic intervertebral disc heights appear relatively relatively well preserved.    No obvious thoracic central canal stenosis or neural foraminal stenosis is  noted.  MRI is more sensitive and specific if necessary.                                       The emergency physician reviewed the vital signs / test results outlined above.     ED Discussion      Patient seems to have a component of (1) chronic postoperative pain, as well as (2) mechanical constipation that he has been treating with milk of magnesia.     Discussed hydration, fiber, diet change, stool softeners, then progressing to milk of magnesia if these fail. He can seek pain clinic options for pain improvement.     If these conservative measures fail, he may need conversion to an ostomy if he is having disabling difficulty with colonic transit -- depending on colorectal surg opinion.         ED Medication(s) Administered:  Medications   LIDOcaine 5 % patch 1 patch (1 patch Transdermal Patch Applied 2/7/25 1204)   HYDROmorphone injection 1 mg (1 mg Intravenous Given 2/7/25 1031)   ondansetron injection 8 mg (8 mg Intravenous Given 2/7/25 1035)   iohexoL (OMNIPAQUE 350) injection 100 mL (100 mLs Intravenous Given 2/7/25 1204)   ketorolac injection 15 mg (15 mg Intravenous Given 2/7/25 1132)   morphine injection 4 mg (4 mg Intravenous Given 2/7/25 1131)   morphine injection 4 mg (4 mg Intravenous Given 2/7/25 1203)       Prescription Management: I performed a review of the patient's current Rx medication list as input by nursing staff.    Patient's Medications   New Prescriptions    POLYETHYLENE GLYCOL (GLYCOLAX) 17 GRAM/DOSE POWDER    Take 17 g by mouth once daily.    PSYLLIUM HUSK (METAMUCIL) 0.4 GRAM CAP    Take 1 capsule by mouth 3 (three) times daily as needed (constipation).   Previous Medications    ALBUTEROL (PROVENTIL HFA) 90 MCG/ACTUATION INHALER    Inhale 2 puffs into the lungs every 6 (six) hours as needed for Wheezing. Rescue    ATORVASTATIN (LIPITOR) 40 MG TABLET    Take 1 tablet by mouth once daily.    DOXYCYCLINE (VIBRA-TABS) 100 MG TABLET    Take 1 tablet (100 mg total) by mouth 2 (two) times  daily. for 10 days    ERGOCALCIFEROL (ERGOCALCIFEROL) 50,000 UNIT CAP    Take 1 capsule (50,000 Units total) by mouth every 7 days.    FLUTICASONE PROPIONATE (FLONASE) 50 MCG/ACTUATION NASAL SPRAY    2 sprays (100 mcg total) by Each Nostril route daily as needed for Rhinitis or Allergies.    LISINOPRIL (PRINIVIL,ZESTRIL) 20 MG TABLET    Take 20 mg by mouth once daily.    LORAZEPAM (ATIVAN) 0.5 MG TABLET    0.25 mg.    MULTIVITAMIN (THERAGRAN) PER TABLET    Take 1 tablet by mouth once daily.    NITROGLYCERIN (NITROSTAT) 0.4 MG SL TABLET    Place 1 tablet every 3-4 hours by sublingual route as needed.    PANTOPRAZOLE (PROTONIX) 40 MG TABLET    Take 40 mg by mouth once daily.    PLECANATIDE (TRULANCE) 3 MG TAB    Take 1 tablet (3 mg total) by mouth Daily.    SUCRALFATE (CARAFATE) 1 GRAM TABLET    Take 1 g by mouth 4 (four) times daily.   Modified Medications    No medications on file   Discontinued Medications    No medications on file         Follow-up Information       Schedule an appointment as soon as possible for a visit  with with pain specialist.    Contact information:  (Referral sent; you will receive a call from [facility] within the next 1-2 business days to schedule an appointment.)             Schedule an appointment as soon as possible for a visit  with Zara Reardon MD.    Specialty: Family Medicine  Contact information:  [address] [facility] [address]  [phone]               [facility] Emergency Dept.    Specialty: Emergency Medicine  Why: As needed, If symptoms worsen  Contact information:  [address] [facility] [address] [phone]                          Clinical Impression       ICD-10-CM ICD-9-CM   1. Chronic postoperative pain  G89.28 338.28   2. Constipation by delayed colonic transit  K59.01 564.01      ED Disposition Condition    Discharge Stable             Ezekiel Ayala MD  02/07/25 4291

## 2025-02-13 ENCOUNTER — TELEPHONE (OUTPATIENT)
Dept: DERMATOLOGY | Facility: CLINIC | Age: 50
End: 2025-02-13
Payer: COMMERCIAL

## 2025-02-14 ENCOUNTER — TELEPHONE (OUTPATIENT)
Dept: PAIN MEDICINE | Facility: CLINIC | Age: 50
End: 2025-02-14

## 2025-02-14 ENCOUNTER — OFFICE VISIT (OUTPATIENT)
Dept: PAIN MEDICINE | Facility: CLINIC | Age: 50
End: 2025-02-14
Payer: COMMERCIAL

## 2025-02-14 VITALS — DIASTOLIC BLOOD PRESSURE: 87 MMHG | SYSTOLIC BLOOD PRESSURE: 136 MMHG | HEART RATE: 86 BPM

## 2025-02-14 DIAGNOSIS — R10.9 CHRONIC ABDOMINAL PAIN: Primary | ICD-10-CM

## 2025-02-14 DIAGNOSIS — R10.9 CHRONIC ABDOMINAL PAIN: ICD-10-CM

## 2025-02-14 DIAGNOSIS — Z90.49 S/P COLON RESECTION: Primary | ICD-10-CM

## 2025-02-14 DIAGNOSIS — R10.9 LEFT-SIDED ABDOMINAL PAIN OF UNKNOWN ETIOLOGY: ICD-10-CM

## 2025-02-14 DIAGNOSIS — G89.29 CHRONIC ABDOMINAL PAIN: ICD-10-CM

## 2025-02-14 DIAGNOSIS — G89.29 CHRONIC ABDOMINAL PAIN: Primary | ICD-10-CM

## 2025-02-14 PROCEDURE — 99999 PR PBB SHADOW E&M-EST. PATIENT-LVL II: CPT | Mod: PBBFAC,,, | Performed by: ANESTHESIOLOGY

## 2025-02-14 NOTE — TELEPHONE ENCOUNTER
Types of orders made on 02/14/2025: Outpatient Referral, Procedure Request      Order Date:2/14/2025   Ordering User:CLIFFORD GOYAL [[id number]]   Encounter Provider:Clifford Goyal MD [[id number]]   Authorizing Provider: Clifford Goyal MD [[id number]]   Department:[facility] PAIN MANAGEMENT[[id number]]      Common Order Information   Procedure -> Other (Specify location and laterality) Cmt: Left Celiac Plexus             Nerve block      Order Specific Information   Order: Procedure Order to Pain Management [Custom: [id number]]  Order #:          [id number]: 1 FUTURE     Priority: Routine  Class: Clinic Performed     Future Order Information       Expires on:02/14/2026            Expected by:02/14/2025                   Associated Diagnoses       R10.9, G89.29 Chronic abdominal pain       Facility Name: -> [facility]              Priority: Routine  Class: Clinic Performed     Future Order Information       Expires on:02/14/2026            Expected by:02/14/2025                   Associated Diagnoses       R10.9, G89.29 Chronic abdominal pain       Procedure -> Other (Specify location and laterality) Cmt: Left Celiac                 Plexus Nerve block

## 2025-02-14 NOTE — PROGRESS NOTES
This note was completed with dictation software and grammatical errors may exist.    Referring Physician: Ezekiel Ayala MD    PCP: Zara Reardon MD      CC: left sided abdominal pain    HPI:   Shady Snyder is a 49 y.o. male referred to us for left-sided abdominal pain.  Patient with extensive history of chronic abdominal pain for over 2 years.  He had his colon removed.  He states being tested for parasites and had parasites removed recently.  He is wearing a back for the path results.  He has [place] [facility]  He is still very unclear what is causing his chronic abdominal pain.  Presents to us with constant aching, sharp, stabbing pain over his left upper abdomen area.  Pain comes and goes.  Improves rest.  He takes medical marijuana with some mild benefits.  He denies any worsening weakness.  No bowel bladder changes    ROS:  CONSTITUTIONAL: No fevers, chills, night sweats, wt. loss, appetite changes  SKIN: no rashes or itching  ENT: No headaches, head trauma, vision changes, or eye pain  LYMPH NODES: None noticed   CV: No chest pain, palpitations.   RESP: No shortness of breath, dyspnea on exertion, cough, wheezing, or hemoptysis  GI: No nausea, emesis, diarrhea, constipation, melena, hematochezia. +HPI   : No dysuria, hematuria, urgency, or frequency   HEME: No easy bruising, bleeding problems  PSYCHIATRIC: No depression, anxiety, psychosis, hallucinations.  NEURO: No seizures, memory loss, dizziness or difficulty sleeping  MSK: no joint pain      Past Medical History:   Diagnosis Date    Diverticulitis     GERD (gastroesophageal reflux disease)     HTN (hypertension)     Kidney stone     Sleep apnea      Past Surgical History:   Procedure Laterality Date    CHOLECYSTECTOMY      COLON SURGERY N/A 02/22/2021    12 inches of colon removed    COLONOSCOPY N/A 12/19/2023    Procedure: COLONOSCOPY;  Surgeon: Ervin Kirkpatrick III, MD;  Location: [facility]  ENDO;  Service: Endoscopy;  Laterality: N/A;    ESOPHAGOGASTRODUODENOSCOPY N/A 12/19/2023    Procedure: EGD (ESOPHAGOGASTRODUODENOSCOPY);  Surgeon: Ervin Kirkpatrick III, MD;  Location: [facility];  Service: Endoscopy;  Laterality: N/A;    FRACTURE SURGERY      rt foot surgery      VASECTOMY       Family History   Problem Relation Name Age of Onset    Cancer Maternal Grandmother      Cancer Maternal Grandfather      Heart disease Paternal Grandfather       Social History     Socioeconomic History    Marital status:    Tobacco Use    Smoking status: Never    Smokeless tobacco: Never   Substance and Sexual Activity    Alcohol use: Not Currently     Alcohol/week: 3.0 standard drinks of alcohol     Types: 2 Cans of beer, 1 Standard drinks or equivalent per week     Comment: Socially    Drug use: Yes     Types: Marijuana     Comment: daily for pain-can't take pain pills         Medications/Allergies: See med card    Vitals:    02/14/25 1307   BP: 136/87   Pulse: 86   PainSc: 10-Worst pain ever         Physical exam:    GENERAL: A and O x3, the patient appears well groomed and is in no acute distress.  Skin: No rashes or obvious lesions  HEENT: normocephalic, atraumatic  CARDIOVASCULAR:  Palpable peripheral pulses  LUNGS: easy work of breathing  ABDOMEN: soft, nontender   UPPER EXTREMITIES: Normal alignment, normal range of motion, no atrophy, no skin changes,  hair growth and nail growth normal and equal bilaterally. No swelling, no tenderness.    LOWER EXTREMITIES:  Normal alignment, normal range of motion, no atrophy, no skin changes,  hair growth and nail growth normal and equal bilaterally. No swelling, no tenderness.      LUMBAR SPINE  Lumbar spine: ROM is full with flexion extension and oblique extension with no increased pain.    Yonny's test causes no increased pain on either side.    Supine straight leg raise is negative bilaterally.    Internal and external rotation of the hip causes no increased  pain on either side.  Myofascial exam: No tenderness to palpation across lumbar paraspinous muscles.      MENTAL STATUS: normal orientation, speech, language, and fund of knowledge for social situation.  Emotional state appropriate.    CRANIAL NERVES:  II:  PERRL bilaterally,   III,IV,VI: EOMI.    V:  Facial sensation equal bilaterally  VII:  Facial motor function normal.  VIII:  Hearing equal to finger rub bilaterally  IX/X: Gag normal, palate symmetric  XI:  Shoulder shrug equal, head turn equal  XII:  Tongue midline without fasciculations      MOTOR: Tone and bulk: normal bilateral upper and lower Strength: normal   Delt Bi Tri WE WF     R 5 5 5 5 5 5   L 5 5 5 5 5 5     IP ADD ABD Quad TA Gas HAM  R 5 5 5 5 5 5 5  L 5 5 5 5 5 5 5    SENSATION: Light touch and pinprick intact bilaterally  REFLEXES: normal, symmetric, nonbrisk.  Toes down, no clonus. No hoffmans.  GAIT: normal rise, base, steps, and arm swing.        Imaging:  CT abdomen 11/2025  No acute abnormality, and no significant interval changes. Prior colon resection, cholecystectomy. Colonic diverticula, renal cysts     Assessment:  Patient presents with chronic abdominal pain  1. S/P colon resection    2. Chronic abdominal pain    3. Left-sided abdominal pain of unknown etiology          Plan:  I have stressed the importance of physical activity and exercise to improve overall health  Reviewed pertinent imaging and records with patient  Patient with chronic left-sided abdominal pain.  Continue evaluation with Gastroenterology.  He is awaiting path for possible parasite infection.  We discussed performing a celiac plexus nerve block to see if his pain is sympathetically maintained.  Patient wishes to proceed with procedure.  Follow up after procedure    Thank you for referring this interesting patient, and I look forward to continuing to collaborate in his care.

## 2025-02-14 NOTE — H&P (VIEW-ONLY)
This note was completed with dictation software and grammatical errors may exist.    Referring Physician: Ezekiel Ayala MD    PCP: Zara Reardon MD      CC: left sided abdominal pain    HPI:   Shady Snyder is a 49 y.o. male referred to us for left-sided abdominal pain.  Patient with extensive history of chronic abdominal pain for over 2 years.  He had his colon removed.  He states being tested for parasites and had parasites removed recently.  He is wearing a back for the path results.  He has been evaluated by multiple GI doctors most recently at [facility] [place], [place].  He is still very unclear what is causing his chronic abdominal pain.  Presents to us with constant aching, sharp, stabbing pain over his left upper abdomen area.  Pain comes and goes.  Improves rest.  He takes medical marijuana with some mild benefits.  He denies any worsening weakness.  No bowel bladder changes    ROS:  CONSTITUTIONAL: No fevers, chills, night sweats, wt. loss, appetite changes  SKIN: no rashes or itching  ENT: No headaches, head trauma, vision changes, or eye pain  LYMPH NODES: None noticed   CV: No chest pain, palpitations.   RESP: No shortness of breath, dyspnea on exertion, cough, wheezing, or hemoptysis  GI: No nausea, emesis, diarrhea, constipation, melena, hematochezia. +HPI   : No dysuria, hematuria, urgency, or frequency   HEME: No easy bruising, bleeding problems  PSYCHIATRIC: No depression, anxiety, psychosis, hallucinations.  NEURO: No seizures, memory loss, dizziness or difficulty sleeping  MSK: no joint pain      Past Medical History:   Diagnosis Date    Diverticulitis     GERD (gastroesophageal reflux disease)     HTN (hypertension)     Kidney stone     Sleep apnea      Past Surgical History:   Procedure Laterality Date    CHOLECYSTECTOMY      COLON SURGERY N/A 02/22/2021    12 inches of colon removed    COLONOSCOPY N/A 12/19/2023    Procedure: COLONOSCOPY;  Surgeon: Ervin Kirkpatrick III, MD;  Location: [facility]  ENDO;  Service: Endoscopy;  Laterality: N/A;    ESOPHAGOGASTRODUODENOSCOPY N/A 12/19/2023    Procedure: EGD (ESOPHAGOGASTRODUODENOSCOPY);  Surgeon: Ervin Kirkpatrick III, MD;  Location: [facility];  Service: Endoscopy;  Laterality: N/A;    FRACTURE SURGERY      rt foot surgery      VASECTOMY       Family History   Problem Relation Name Age of Onset    Cancer Maternal Grandmother      Cancer Maternal Grandfather      Heart disease Paternal Grandfather       Social History     Socioeconomic History    Marital status:    Tobacco Use    Smoking status: Never    Smokeless tobacco: Never   Substance and Sexual Activity    Alcohol use: Not Currently     Alcohol/week: 3.0 standard drinks of alcohol     Types: 2 Cans of beer, 1 Standard drinks or equivalent per week     Comment: Socially    Drug use: Yes     Types: Marijuana     Comment: daily for pain-can't take pain pills         Medications/Allergies: See med card    Vitals:    02/14/25 1307   BP: 136/87   Pulse: 86   PainSc: 10-Worst pain ever         Physical exam:    GENERAL: A and O x3, the patient appears well groomed and is in no acute distress.  Skin: No rashes or obvious lesions  HEENT: normocephalic, atraumatic  CARDIOVASCULAR:  Palpable peripheral pulses  LUNGS: easy work of breathing  ABDOMEN: soft, nontender   UPPER EXTREMITIES: Normal alignment, normal range of motion, no atrophy, no skin changes,  hair growth and nail growth normal and equal bilaterally. No swelling, no tenderness.    LOWER EXTREMITIES:  Normal alignment, normal range of motion, no atrophy, no skin changes,  hair growth and nail growth normal and equal bilaterally. No swelling, no tenderness.      LUMBAR SPINE  Lumbar spine: ROM is full with flexion extension and oblique extension with no increased pain.    Yonny's test causes no increased pain on either side.    Supine straight leg raise is negative bilaterally.    Internal and external rotation of the hip causes no increased  pain on either side.  Myofascial exam: No tenderness to palpation across lumbar paraspinous muscles.      MENTAL STATUS: normal orientation, speech, language, and fund of knowledge for social situation.  Emotional state appropriate.    CRANIAL NERVES:  II:  PERRL bilaterally,   III,IV,VI: EOMI.    V:  Facial sensation equal bilaterally  VII:  Facial motor function normal.  VIII:  Hearing equal to finger rub bilaterally  IX/X: Gag normal, palate symmetric  XI:  Shoulder shrug equal, head turn equal  XII:  Tongue midline without fasciculations      MOTOR: Tone and bulk: normal bilateral upper and lower Strength: normal   Delt Bi Tri WE WF     R 5 5 5 5 5 5   L 5 5 5 5 5 5     IP ADD ABD Quad TA Gas HAM  R 5 5 5 5 5 5 5  L 5 5 5 5 5 5 5    SENSATION: Light touch and pinprick intact bilaterally  REFLEXES: normal, symmetric, nonbrisk.  Toes down, no clonus. No hoffmans.  GAIT: normal rise, base, steps, and arm swing.        Imaging:  CT abdomen 11/2025  No acute abnormality, and no significant interval changes. Prior colon resection, cholecystectomy. Colonic diverticula, renal cysts     Assessment:  Patient presents with chronic abdominal pain  1. S/P colon resection    2. Chronic abdominal pain    3. Left-sided abdominal pain of unknown etiology          Plan:  I have stressed the importance of physical activity and exercise to improve overall health  Reviewed pertinent imaging and records with patient  Patient with chronic left-sided abdominal pain.  Continue evaluation with Gastroenterology.  He is awaiting path for possible parasite infection.  We discussed performing a celiac plexus nerve block to see if his pain is sympathetically maintained.  Patient wishes to proceed with procedure.  Follow up after procedure    Thank you for referring this interesting patient, and I look forward to continuing to collaborate in his care.

## 2025-02-20 ENCOUNTER — OFFICE VISIT (OUTPATIENT)
Dept: DERMATOLOGY | Facility: CLINIC | Age: 50
End: 2025-02-20
Payer: COMMERCIAL

## 2025-02-20 DIAGNOSIS — D17.9 MULTIPLE LIPOMAS: Primary | ICD-10-CM

## 2025-02-20 DIAGNOSIS — L73.0 ACNE KELOIDALIS NUCHAE: ICD-10-CM

## 2025-02-20 RX ORDER — CLINDAMYCIN PHOSPHATE 11.9 MG/ML
SOLUTION TOPICAL 2 TIMES DAILY
Qty: 60 ML | Refills: 5 | Status: SHIPPED | OUTPATIENT
Start: 2025-02-20

## 2025-02-20 NOTE — PROGRESS NOTES
Subjective:      Patient ID:  Shady Snyder is a 49 y.o. male who presents for No chief complaint on file.    Pt here for several cysts    Pt denies h/o skin cancer     Pt has several tender cysts on front of abdomen and posterior scalp for at least 1 year.             He has a cyst on his left posterior scalp that has been present x 1 year and some bumps on his abdomen present x 1 year. Not bothersome. He states it has been noticed and mentioned by multiple doctors and told he needs to have it evaluated. Unclear If they were worried or concerned or if it came up incidentally    Review of Systems    Objective:   Physical Exam   Constitutional: He appears well-developed and well-nourished.   Neurological: He is alert and oriented to person, place, and time.   Psychiatric: He has a normal mood and affect.   Skin:   Areas Examined (abnormalities noted in diagram):   Scalp / Hair Palpated and Inspected  Head / Face Inspection Performed  Abdomen Inspection Performed                 Diagram Legend     Erythematous scaling macule/papule c/w actinic keratosis       Vascular papule c/w angioma      Pigmented verrucoid papule/plaque c/w seborrheic keratosis      Yellow umbilicated papule c/w sebaceous hyperplasia      Irregularly shaped tan macule c/w lentigo     1-2 mm smooth white papules consistent with Milia      Movable subcutaneous cyst with punctum c/w epidermal inclusion cyst      Subcutaneous movable cyst c/w pilar cyst      Firm pink to brown papule c/w dermatofibroma      Pedunculated fleshy papule(s) c/w skin tag(s)      Evenly pigmented macule c/w junctional nevus     Mildly variegated pigmented, slightly irregular-bordered macule c/w mildly atypical nevus      Flesh colored to evenly pigmented papule c/w intradermal nevus       Pink pearly papule/plaque c/w basal cell carcinoma      Erythematous hyperkeratotic cursted plaque c/w SCC      Surgical scar with no sign of skin cancer recurrence      Open and  closed comedones      Inflammatory papules and pustules      Verrucoid papule consistent consistent with wart     Erythematous eczematous patches and plaques     Dystrophic onycholytic nail with subungual debris c/w onychomycosis     Umbilicated papule    Erythematous-base heme-crusted tan verrucoid plaque consistent with inflamed seborrheic keratosis     Erythematous Silvery Scaling Plaque c/w Psoriasis     See annotation      Assessment / Plan:        Multiple lipomas  -     Ambulatory referral/consult to ENT; Future; Expected date: 02/27/2025    Reassured on lesion on abdomen. Would not recommend excision given depth and small size and benign nature    For lesion on left occipital scalp, somewhat illdefined subQ prominence, possibly lipoma? It is bothersome so he would like to discuss excision. Will refer to ENT given location    Acne keloidalis nuchae  -     clindamycin (CLEOCIN T) 1 % external solution; Apply topically 2 (two) times daily. Apply to area prone to pimples on back of scalp  Dispense: 60 mL; Refill: 5

## 2025-02-24 ENCOUNTER — DOCUMENTATION ONLY (OUTPATIENT)
Dept: HEMATOLOGY/ONCOLOGY | Facility: CLINIC | Age: 50
End: 2025-02-24
Payer: COMMERCIAL

## 2025-02-24 NOTE — NURSING
Received [facility] requesting appt with Dr. Audrey Ch for multiple lipomas on the scalp. Patient prefers to stay in [place] and he is scheduled to see Dr. Lawson this week. No further navigation needs in [place]

## 2025-02-26 ENCOUNTER — OFFICE VISIT (OUTPATIENT)
Dept: OTOLARYNGOLOGY | Facility: CLINIC | Age: 50
End: 2025-02-26
Payer: COMMERCIAL

## 2025-02-26 ENCOUNTER — PATIENT MESSAGE (OUTPATIENT)
Dept: INTERNAL MEDICINE | Facility: CLINIC | Age: 50
End: 2025-02-26
Payer: COMMERCIAL

## 2025-02-26 VITALS — HEIGHT: 71 IN | WEIGHT: 243 LBS | BODY MASS INDEX: 34.02 KG/M2

## 2025-02-26 DIAGNOSIS — L72.3 SEBACEOUS CYST: ICD-10-CM

## 2025-02-26 DIAGNOSIS — D17.0 LIPOMA OF NECK: Primary | ICD-10-CM

## 2025-02-26 PROCEDURE — 1159F MED LIST DOCD IN RCRD: CPT | Mod: S$GLB,,, | Performed by: OTOLARYNGOLOGY

## 2025-02-26 PROCEDURE — 99999 PR PBB SHADOW E&M-EST. PATIENT-LVL V: CPT | Mod: PBBFAC,,, | Performed by: OTOLARYNGOLOGY

## 2025-02-26 PROCEDURE — 99204 OFFICE O/P NEW MOD 45 MIN: CPT | Mod: S$GLB,,, | Performed by: OTOLARYNGOLOGY

## 2025-02-26 PROCEDURE — 3008F BODY MASS INDEX DOCD: CPT | Mod: S$GLB,,, | Performed by: OTOLARYNGOLOGY

## 2025-02-26 NOTE — PROGRESS NOTES
"Subjective:       Patient ID: Shady Snyder is a 49 y.o. male.    Chief Complaint: Mass (Patient present with c/o a "lipoma" in the occipital region of his head. He'd like it biopsied and sent for culture. )      A slowly growing mass in the left nape of his neck it tells me gets sore and bothers him and that is slowly enlarging and he also has at the hairline on the right currently about a 1 cm mass that intermittently gets infected drained and then settled down but has been cycling multiple times.          Objective:      ENT Physical Exam    This fairly healthy 49-year-old gentleman who does have some abdominal pain syndrome that he is having dealt with by the pain management doctor but otherwise is thought to have healthy heart and lungs and such shows me the areas in question     At the left hairline he has about a three or 4 cm soft subcutaneous mass that is almost certainly a lipoma and on the right more in the skin there has a about a 1 cm sebaceous cyst ingrown hair or scar ball from repeated infections.        Assessment:       1. Lipoma of neck    2. Sebaceous cyst         Plan:          We discussed the options to deal with these benign lesions and I am setting him up in two weeks at the hospital under local to remove the left posterior neck lipoma and right posterior neck probable sebaceous cyst.        "

## 2025-02-27 ENCOUNTER — HOSPITAL ENCOUNTER (OUTPATIENT)
Facility: HOSPITAL | Age: 50
Discharge: HOME | End: 2025-02-27
Attending: ANESTHESIOLOGY | Admitting: ANESTHESIOLOGY
Payer: COMMERCIAL

## 2025-02-27 DIAGNOSIS — G89.29 CHRONIC ABDOMINAL PAIN: ICD-10-CM

## 2025-02-27 DIAGNOSIS — R10.9 CHRONIC ABDOMINAL PAIN: ICD-10-CM

## 2025-02-27 PROCEDURE — 64530 N BLOCK INJ CELIAC PELUS: CPT | Mod: LT | Performed by: ANESTHESIOLOGY

## 2025-02-27 PROCEDURE — 64530 N BLOCK INJ CELIAC PELUS: CPT | Mod: LT,,, | Performed by: ANESTHESIOLOGY

## 2025-02-27 PROCEDURE — 25500020 PHARM REV CODE 255: Performed by: ANESTHESIOLOGY

## 2025-02-27 PROCEDURE — 63600175 PHARM REV CODE 636 W HCPCS: Performed by: ANESTHESIOLOGY

## 2025-02-27 PROCEDURE — 25000003 PHARM REV CODE 250: Performed by: ANESTHESIOLOGY

## 2025-02-27 RX ORDER — BUPIVACAINE HYDROCHLORIDE AND EPINEPHRINE 2.5; 5 MG/ML; UG/ML
INJECTION, SOLUTION EPIDURAL; INFILTRATION; INTRACAUDAL; PERINEURAL
Status: DISCONTINUED | OUTPATIENT
Start: 2025-02-27 | End: 2025-02-27 | Stop reason: HOSPADM

## 2025-02-27 RX ORDER — MIDAZOLAM HYDROCHLORIDE 1 MG/ML
INJECTION INTRAMUSCULAR; INTRAVENOUS
Status: DISCONTINUED | OUTPATIENT
Start: 2025-02-27 | End: 2025-02-27 | Stop reason: HOSPADM

## 2025-02-27 RX ORDER — LIDOCAINE HYDROCHLORIDE 10 MG/ML
1 INJECTION, SOLUTION EPIDURAL; INFILTRATION; INTRACAUDAL; PERINEURAL ONCE
Status: DISCONTINUED | OUTPATIENT
Start: 2025-02-27 | End: 2025-02-27 | Stop reason: HOSPADM

## 2025-02-27 RX ORDER — FENTANYL CITRATE 50 UG/ML
INJECTION, SOLUTION INTRAMUSCULAR; INTRAVENOUS
Status: DISCONTINUED | OUTPATIENT
Start: 2025-02-27 | End: 2025-02-27 | Stop reason: HOSPADM

## 2025-02-27 RX ORDER — SODIUM CHLORIDE, SODIUM LACTATE, POTASSIUM CHLORIDE, CALCIUM CHLORIDE 600; 310; 30; 20 MG/100ML; MG/100ML; MG/100ML; MG/100ML
INJECTION, SOLUTION INTRAVENOUS CONTINUOUS
Status: DISCONTINUED | OUTPATIENT
Start: 2025-02-27 | End: 2025-02-27 | Stop reason: HOSPADM

## 2025-02-27 RX ORDER — LIDOCAINE HYDROCHLORIDE 10 MG/ML
INJECTION, SOLUTION EPIDURAL; INFILTRATION; INTRACAUDAL; PERINEURAL
Status: DISCONTINUED | OUTPATIENT
Start: 2025-02-27 | End: 2025-02-27 | Stop reason: HOSPADM

## 2025-02-27 RX ORDER — DEXAMETHASONE SODIUM PHOSPHATE 10 MG/ML
INJECTION, SOLUTION INTRA-ARTICULAR; INTRALESIONAL; INTRAMUSCULAR; INTRAVENOUS; SOFT TISSUE
Status: DISCONTINUED | OUTPATIENT
Start: 2025-02-27 | End: 2025-02-27 | Stop reason: HOSPADM

## 2025-02-27 NOTE — OP NOTE
PROCEDURE DATE: 2/27/2025    PROCEDURE: Left side celiac plexus nerve block utilizing fluoroscopy    DIAGNOSIS: Chronic Abdominal Pain  Post Op diagnosis: Same    PHYSICIAN: Fernando Goyal MD    MEDICATIONS INJECTED:      Bupivicaine with 1:200,000 epinephrine, 15 ml total with 10mg of dexamethasone at each level    LOCAL ANESTHETIC INJECTED:  Lidocaine 1%. 2ml per site.    SEDATION MEDICATIONS: RN IV sedation    ESTIMATED BLOOD LOSS:  None    COMPLICATIONS:  None    TECHNIQUE:   A time-out taken to identify patient and procedure side prior to starting the procedure. The patient was placed in a prone position, prepped and draped in the usual sterile fashion using ChloraPrep and sterile towels.  The area to be injected was determined under fluoroscopic guidance in AP and oblique view. The fluoroscope was rotated to a left -side oblique view at which point the tip of the L1 transverse process was noted to be parallel to the L1 vertebral body.  Local anesthetic was given by raising a wheel and going down to the hub of a 25-gauge 1.5 inch needle.  In oblique view, a 5 inch 22-gauge bent-tip spinal needle was introduced and followed just anterior to the transverse process until it made contact with the vertebral body. After negative aspiration for blood or air, 2ml contrast dye was injected to confirm appropriate placement and that there was no vascular uptake, and this was also performed under live digital subtraction. The test dose as noted above was given over 4 minutes and there were no signs of HR or BP changes, no tinnitus or circumoral numbness. Again, aspiration was negative for blood or air and the treatment medication was then given slowly over 5 minutes. The patient tolerated the procedure well.    The patient was monitored after the procedure. The patient was given post procedure and discharge instructions to follow at home. The patient was discharged in a stable condition.

## 2025-02-27 NOTE — INTERVAL H&P NOTE
The patient has been examined and the H&P has been reviewed:    I concur with the findings and no changes have occurred since H&P was written.    Surgery risks, benefits and alternative options discussed and understood by patient/family.    This patient has been cleared for surgery in an ambulatory surgical facility    ASA 3,  Mallampatti Score 3  No history of anesthetic complications  Plan for RN IV sedation        There are no hospital problems to display for this patient.    
[place]

## 2025-02-27 NOTE — DISCHARGE SUMMARY
[facility] - Periop Services  Discharge Note  Short Stay    Procedure(s) (LRB):  BLOCK, CELIAC PLEXUS (Left)      OUTCOME: Patient tolerated treatment/procedure well without complication and is now ready for discharge.    DISPOSITION: Home or Self Care    FINAL DIAGNOSIS:  <principal problem not specified>    FOLLOWUP: In clinic    DISCHARGE INSTRUCTIONS:    Discharge Procedure Orders   Notify your health care provider if you experience any of the following:  temperature >100.4     Notify your health care provider if you experience any of the following:  severe uncontrolled pain     Notify your health care provider if you experience any of the following:  redness, tenderness, or signs of infection (pain, swelling, redness, odor or green/yellow discharge around incision site)     Activity as tolerated        TIME SPENT ON DISCHARGE: 30 minutes

## 2025-02-28 VITALS
OXYGEN SATURATION: 97 % | HEIGHT: 71 IN | DIASTOLIC BLOOD PRESSURE: 70 MMHG | WEIGHT: 248.88 LBS | TEMPERATURE: 97 F | BODY MASS INDEX: 34.84 KG/M2 | RESPIRATION RATE: 18 BRPM | HEART RATE: 71 BPM | SYSTOLIC BLOOD PRESSURE: 120 MMHG

## (undated) DEVICE — GLOVE SENSICARE PI GRN 7.5

## (undated) DEVICE — SYS LABEL CORRECT MED

## (undated) DEVICE — NDL SPINAL 22GX5

## (undated) DEVICE — TUBING MINIBORE EXTENSION

## (undated) DEVICE — CHLORAPREP 10.5 ML APPLICATOR